# Patient Record
Sex: FEMALE | Race: WHITE
[De-identification: names, ages, dates, MRNs, and addresses within clinical notes are randomized per-mention and may not be internally consistent; named-entity substitution may affect disease eponyms.]

---

## 2017-08-11 ENCOUNTER — HOSPITAL ENCOUNTER (OUTPATIENT)
Dept: HOSPITAL 47 - RADUSWWP | Age: 22
Discharge: HOME | End: 2017-08-11
Payer: COMMERCIAL

## 2017-08-11 DIAGNOSIS — R10.11: Primary | ICD-10-CM

## 2017-08-11 LAB
ALP SERPL-CCNC: 76 U/L (ref 38–126)
ALT SERPL-CCNC: 33 U/L (ref 9–52)
ANION GAP SERPL CALC-SCNC: 11 MMOL/L
AST SERPL-CCNC: 21 U/L (ref 14–36)
BASOPHILS # BLD AUTO: 0 K/UL (ref 0–0.2)
BASOPHILS NFR BLD AUTO: 1 %
BUN SERPL-SCNC: 8 MG/DL (ref 7–17)
CALCIUM SPEC-MCNC: 9.1 MG/DL (ref 8.4–10.2)
CH: 27.5
CHCM: 34.8
CHLORIDE SERPL-SCNC: 105 MMOL/L (ref 98–107)
CHOLEST SERPL-MCNC: 171 MG/DL (ref ?–200)
CO2 SERPL-SCNC: 25 MMOL/L (ref 22–30)
EOSINOPHIL # BLD AUTO: 0.2 K/UL (ref 0–0.7)
EOSINOPHIL NFR BLD AUTO: 3 %
ERYTHROCYTE [DISTWIDTH] IN BLOOD BY AUTOMATED COUNT: 5.06 M/UL (ref 3.8–5.4)
ERYTHROCYTE [DISTWIDTH] IN BLOOD: 13.4 % (ref 11.5–15.5)
GLUCOSE SERPL-MCNC: 82 MG/DL (ref 74–99)
HCT VFR BLD AUTO: 40.1 % (ref 34–46)
HDLC SERPL-MCNC: 43 MG/DL (ref 40–60)
HDW: 3
HGB BLD-MCNC: 13.9 GM/DL (ref 11.4–16)
LUC NFR BLD AUTO: 2 %
LYMPHOCYTES # SPEC AUTO: 2 K/UL (ref 1–4.8)
LYMPHOCYTES NFR SPEC AUTO: 29 %
MCH RBC QN AUTO: 27.4 PG (ref 25–35)
MCHC RBC AUTO-ENTMCNC: 34.6 G/DL (ref 31–37)
MCV RBC AUTO: 79.3 FL (ref 80–100)
MONOCYTES # BLD AUTO: 0.4 K/UL (ref 0–1)
MONOCYTES NFR BLD AUTO: 5 %
NEUTROPHILS # BLD AUTO: 4.1 K/UL (ref 1.3–7.7)
NEUTROPHILS NFR BLD AUTO: 59 %
NON-AFRICAN AMERICAN GFR(MDRD): >60
POTASSIUM SERPL-SCNC: 4.4 MMOL/L (ref 3.5–5.1)
PROT SERPL-MCNC: 7.1 G/DL (ref 6.3–8.2)
SODIUM SERPL-SCNC: 141 MMOL/L (ref 137–145)
WBC # BLD AUTO: 0.14 10*3/UL
WBC # BLD AUTO: 6.9 K/UL (ref 3.8–10.6)
WBC (PEROX): 7.41

## 2017-08-11 PROCEDURE — 76705 ECHO EXAM OF ABDOMEN: CPT

## 2017-08-11 PROCEDURE — 85025 COMPLETE CBC W/AUTO DIFF WBC: CPT

## 2017-08-11 PROCEDURE — 80061 LIPID PANEL: CPT

## 2017-08-11 PROCEDURE — 84443 ASSAY THYROID STIM HORMONE: CPT

## 2017-08-11 PROCEDURE — 36415 COLL VENOUS BLD VENIPUNCTURE: CPT

## 2017-08-11 PROCEDURE — 80053 COMPREHEN METABOLIC PANEL: CPT

## 2017-08-11 NOTE — US
EXAMINATION TYPE: US gallbladder

 

DATE OF EXAM: 8/11/2017

 

COMPARISON: CT abdomen and pelvis November 18, 2013

 

CLINICAL HISTORY: R10.11 RUQ PAIN. Pain ongoing for 1 month

 

EXAM MEASUREMENTS:

 

Liver Length:  18.5 cm   

Gallbladder Wall:  0.2 cm   

CBD:  0.5 cm

Right Kidney:  10.0 x 5.8 x 4.5 cm

 

larger body habitus

 

Pancreas:  wnl

Liver:  upper limits of normal for size, difficult to penetrate  

Gallbladder:  0.5cm polyp

**Evidence for sonographic Larkin's sign:  no

CBD:  wnl 

Right Kidney:  wnl 

 

There is 5 mm nonshadowing nonmobile hyperechoic focus felt to reflect small polyp.

 

IMPRESSION: No shadowing mobile gallstones or ultrasound evidence for acute cholecystitis.

## 2018-07-09 ENCOUNTER — HOSPITAL ENCOUNTER (EMERGENCY)
Dept: HOSPITAL 47 - EC | Age: 23
Discharge: HOME | End: 2018-07-09
Payer: COMMERCIAL

## 2018-07-09 VITALS
RESPIRATION RATE: 18 BRPM | SYSTOLIC BLOOD PRESSURE: 121 MMHG | DIASTOLIC BLOOD PRESSURE: 64 MMHG | HEART RATE: 89 BPM | TEMPERATURE: 97.9 F

## 2018-07-09 DIAGNOSIS — L55.0: Primary | ICD-10-CM

## 2018-07-09 DIAGNOSIS — R19.7: ICD-10-CM

## 2018-07-09 DIAGNOSIS — F17.200: ICD-10-CM

## 2018-07-09 DIAGNOSIS — R11.0: ICD-10-CM

## 2018-07-09 PROCEDURE — 96375 TX/PRO/DX INJ NEW DRUG ADDON: CPT

## 2018-07-09 PROCEDURE — 96361 HYDRATE IV INFUSION ADD-ON: CPT

## 2018-07-09 PROCEDURE — 99283 EMERGENCY DEPT VISIT LOW MDM: CPT

## 2018-07-09 PROCEDURE — 96374 THER/PROPH/DIAG INJ IV PUSH: CPT

## 2018-07-09 NOTE — ED
Skin/Abscess/FB HPI





- General


Chief complaint: Skin/Abscess/Foreign Body


Stated complaint: Sunburn/Nausea


Time Seen by Provider: 07/09/18 06:30


Source: patient, RN notes reviewed, old records reviewed


Mode of arrival: ambulatory


Limitations: no limitations





- History of Present Illness


Initial comments: 





23-year-old female presents emergency room stay she would've severe sunburn 

over her entire body.  Patient reports that she was out at the beach yesterday 

for 5 hours, for the first time.  Patient states that she did wear sunblock.  

She reports today she had diarrhea this felt very nauseated.  She reports that 

she is not had any Motrin or Tylenol.  She try to make herself vomit.  She 

denies any fever but reports she feels chilled.  No other symptoms at this time.





- Related Data


 Home Medications











 Medication  Instructions  Recorded  Confirmed


 


Pnv,Calcium 72/Iron/Folic Acid 1 each PO DAILY 05/27/16 06/22/16





[Prenatal Plus Tablet]   








 Previous Rx's











 Medication  Instructions  Recorded


 


Acetaminophen-Codeine 300-30mg 1 each PO Q4HR PRN #30 tab 06/23/16





[Tylenol w/codeine #3]  


 


Ibuprofen [Motrin] 600 mg PO Q6HR PRN #60 tab 06/23/16


 


Ibuprofen [Motrin] 600 mg PO Q8HR PRN #20 tab 07/09/18











 Allergies











Allergy/AdvReac Type Severity Reaction Status Date / Time


 


No Known Allergies Allergy   Verified 07/09/18 06:23














Review of Systems


ROS Statement: 


Those systems with pertinent positive or pertinent negative responses have been 

documented in the HPI.





ROS Other: All systems not noted in ROS Statement are negative.





Past Medical History


Past Medical History: No Reported History


History of Any Multi-Drug Resistant Organisms: None Reported


Past Surgical History: No Surgical Hx Reported


Past Anesthesia/Blood Transfusion Reactions: No Reported Reaction


Past Psychological History: No Psychological Hx Reported


Smoking Status: Current some day smoker


Past Alcohol Use History: Occasional


Past Drug Use History: None Reported





- Past Family History


  ** Father


Family Medical History: No Reported History





General Exam





- General Exam Comments


Initial Comments: 





This Patient since she-year-old female.  Alert and oriented.  No acute distress.


Limitations: no limitations


General appearance: alert, in no apparent distress


Head exam: Present: atraumatic, normocephalic, normal inspection


Eye exam: Present: normal appearance, PERRL, EOMI.  Absent: scleral icterus, 

conjunctival injection, periorbital swelling


ENT exam: Present: normal exam, mucous membranes moist


Neck exam: Present: normal inspection.  Absent: tenderness, meningismus, 

lymphadenopathy


Respiratory exam: Present: normal lung sounds bilaterally.  Absent: respiratory 

distress, wheezes, rales, rhonchi, stridor


Cardiovascular Exam: Present: regular rate, normal rhythm, normal heart sounds.

  Absent: systolic murmur, diastolic murmur, rubs, gallop, clicks


Extremities exam: Present: normal inspection, full ROM, normal capillary 

refill.  Absent: tenderness, pedal edema, joint swelling, calf tenderness


Back exam: Present: normal inspection


Neurological exam: Present: alert, oriented X3, CN II-XII intact


Skin exam: Present: warm, dry, intact, normal color, rash (First-degree burn 

over chest abdomen back legs and arms and face evidence of lines from bathing 

suit.)





Course


 Vital Signs











  07/09/18





  06:19


 


Temperature 98.2 F


 


Pulse Rate 106 H


 


Respiratory 19





Rate 


 


Blood Pressure 110/65


 


O2 Sat by Pulse 100





Oximetry 














Medical Decision Making





- Medical Decision Making


23-year-old female presents emergency room stay she would've severe sunburn 

over her entire body.  Patient reports that she was out at the beach yesterday 

for 5 hours, for the first time.  Patient states that she did wear sunblock.  

She reports today she had diarrhea this felt very nauseated.  Patient has first-

degree burns over her chest, face, back, abdomen, arms and legs.  Patient was 

given 1 L of fluid, Toradol and Zofran.  She was reevaluated and feels much 

better at this time.  Discussion is to take anti-inflammatory medication and do 

no baths as well as apply aloe over her skin.  She is off today for work.  

Return tomorrow.  Discussed that should that she remain hydrated today.  A 

questions answered return parameters were discussed.








Disposition


Clinical Impression: 


 Sunburn of first degree





Disposition: HOME SELF-CARE


Condition: Good


Instructions:  Sunburn (ED)


Additional Instructions: 


Patient  is  to rest, remain hydrated.  Apply aloe to skin for pain relief.  

Patient should return to emergency department if any alarming signs or symptoms 

occur.  Take Motrin every 4-6 hours.


Prescriptions: 


Ibuprofen [Motrin] 600 mg PO Q8HR PRN #20 tab


 PRN Reason: Pain


Is patient prescribed a controlled substance at d/c from ED?: No


When asked, does pt state using other controlled substances?: No


If prescribed controlled substance>3 days was MAPS reviewed?: No


If opioid is for acute pain is fill amount 7 days or less?: No


If Rx opioid, was Start Talking consent form obtained?: No


Referrals: 


Duane Gill Jr, DO [Primary Care Provider] - 1-2 days


Time of Disposition: 07:46

## 2019-03-26 ENCOUNTER — HOSPITAL ENCOUNTER (EMERGENCY)
Dept: HOSPITAL 47 - EC | Age: 24
Discharge: HOME | End: 2019-03-26
Payer: MEDICAID

## 2019-03-26 VITALS — TEMPERATURE: 98.6 F | DIASTOLIC BLOOD PRESSURE: 80 MMHG | SYSTOLIC BLOOD PRESSURE: 123 MMHG | HEART RATE: 79 BPM

## 2019-03-26 VITALS — RESPIRATION RATE: 18 BRPM

## 2019-03-26 DIAGNOSIS — E16.2: Primary | ICD-10-CM

## 2019-03-26 DIAGNOSIS — Z87.891: ICD-10-CM

## 2019-03-26 DIAGNOSIS — J10.1: ICD-10-CM

## 2019-03-26 LAB
ALBUMIN SERPL-MCNC: 4.7 G/DL (ref 3.5–5)
ALP SERPL-CCNC: 74 U/L (ref 38–126)
ALT SERPL-CCNC: 42 U/L (ref 9–52)
ANION GAP SERPL CALC-SCNC: 11 MMOL/L
AST SERPL-CCNC: 33 U/L (ref 14–36)
BASOPHILS # BLD AUTO: 0 K/UL (ref 0–0.2)
BASOPHILS NFR BLD AUTO: 1 %
BUN SERPL-SCNC: 10 MG/DL (ref 7–17)
CALCIUM SPEC-MCNC: 9.2 MG/DL (ref 8.4–10.2)
CHLORIDE SERPL-SCNC: 102 MMOL/L (ref 98–107)
CO2 SERPL-SCNC: 25 MMOL/L (ref 22–30)
EOSINOPHIL # BLD AUTO: 0 K/UL (ref 0–0.7)
EOSINOPHIL NFR BLD AUTO: 1 %
ERYTHROCYTE [DISTWIDTH] IN BLOOD BY AUTOMATED COUNT: 4.91 M/UL (ref 3.8–5.4)
ERYTHROCYTE [DISTWIDTH] IN BLOOD: 12.5 % (ref 11.5–15.5)
GLUCOSE BLD-MCNC: 86 MG/DL (ref 75–99)
GLUCOSE SERPL-MCNC: 78 MG/DL (ref 74–99)
HCT VFR BLD AUTO: 39.8 % (ref 34–46)
HGB BLD-MCNC: 13 GM/DL (ref 11.4–16)
LYMPHOCYTES # SPEC AUTO: 1.4 K/UL (ref 1–4.8)
LYMPHOCYTES NFR SPEC AUTO: 33 %
MCH RBC QN AUTO: 26.5 PG (ref 25–35)
MCHC RBC AUTO-ENTMCNC: 32.7 G/DL (ref 31–37)
MCV RBC AUTO: 81 FL (ref 80–100)
MONOCYTES # BLD AUTO: 0.4 K/UL (ref 0–1)
MONOCYTES NFR BLD AUTO: 8 %
NEUTROPHILS # BLD AUTO: 2.4 K/UL (ref 1.3–7.7)
NEUTROPHILS NFR BLD AUTO: 56 %
PH UR: 6 [PH] (ref 5–8)
PLATELET # BLD AUTO: 199 K/UL (ref 150–450)
POTASSIUM SERPL-SCNC: 4.7 MMOL/L (ref 3.5–5.1)
PROT SERPL-MCNC: 7.9 G/DL (ref 6.3–8.2)
SODIUM SERPL-SCNC: 138 MMOL/L (ref 137–145)
SP GR UR: 1.02 (ref 1–1.03)
UROBILINOGEN UR QL STRIP: <2 MG/DL (ref ?–2)
WBC # BLD AUTO: 4.4 K/UL (ref 3.8–10.6)

## 2019-03-26 PROCEDURE — 93005 ELECTROCARDIOGRAM TRACING: CPT

## 2019-03-26 PROCEDURE — 81025 URINE PREGNANCY TEST: CPT

## 2019-03-26 PROCEDURE — 85025 COMPLETE CBC W/AUTO DIFF WBC: CPT

## 2019-03-26 PROCEDURE — 87502 INFLUENZA DNA AMP PROBE: CPT

## 2019-03-26 PROCEDURE — 80053 COMPREHEN METABOLIC PANEL: CPT

## 2019-03-26 PROCEDURE — 99284 EMERGENCY DEPT VISIT MOD MDM: CPT

## 2019-03-26 PROCEDURE — 96360 HYDRATION IV INFUSION INIT: CPT

## 2019-03-26 PROCEDURE — 81003 URINALYSIS AUTO W/O SCOPE: CPT

## 2019-03-26 PROCEDURE — 96361 HYDRATE IV INFUSION ADD-ON: CPT

## 2019-03-26 PROCEDURE — 36415 COLL VENOUS BLD VENIPUNCTURE: CPT

## 2019-03-26 NOTE — ED
General Adult HPI





- General


Chief complaint: Dizziness


Stated complaint: Hypoglycemia


Time Seen by Provider: 03/26/19 11:39


Source: patient, RN notes reviewed


Mode of arrival: ambulatory


Limitations: no limitations





- History of Present Illness


Initial comments: 





Patient 23-year-old female presented to the emergency room today with chief 

complaint of possible hypoglycemia.  She does admit that she feels like her 

sugars dropping.  She does admit that she's been sick with cough congestion and 

body aches and chills.  She states she has the flu but has not been tested.  She

states the symptoms started 4 days ago.  She states that she's been trying to ea

t and drink here and she states that she's had some dizzy episodes.  She does 

admit that it's better after she eats something.  She states that yesterday she 

felt very dizzy and her father helped her eat some peanut butter which made her 

feel better.  Patient states she feels well at this time is not having the 

dizziness currently.  She denies any other complaints.  States not taken any 

Tylenol or Motrin for any fever. Patient denies any recent shortness of breath, 

chest pain, back pain, abdominal pain, nausea or vomiting, numbness or tingling,

headaches or visual changes, or any other complaints.





- Related Data


                                Home Medications











 Medication  Instructions  Recorded  Confirmed


 


No Known Home Medications  03/26/19 03/26/19











                                    Allergies











Allergy/AdvReac Type Severity Reaction Status Date / Time


 


No Known Allergies Allergy   Verified 03/26/19 11:46














Review of Systems


ROS Statement: 


Those systems with pertinent positive or pertinent negative responses have been 

documented in the HPI.





ROS Other: All systems not noted in ROS Statement are negative.





Past Medical History


Past Medical History: No Reported History


History of Any Multi-Drug Resistant Organisms: None Reported


Past Surgical History: No Surgical Hx Reported


Past Anesthesia/Blood Transfusion Reactions: No Reported Reaction


Past Psychological History: No Psychological Hx Reported


Smoking Status: Former smoker


Past Alcohol Use History: Occasional


Past Drug Use History: None Reported





- Past Family History


  ** Father


Family Medical History: No Reported History





General Exam





- General Exam Comments


Initial Comments: 





General:  The patient is awake and alert, in no distress, and does not appear 

acutely ill. 


Eye:  Pupils are equal, round and reactive to light, extra-ocular movements are 

intact.  No nystagmus.  There is normal conjunctiva bilaterally.  No signs of 

icterus.  


Ears, nose, mouth and throat:  There are moist mucous membranes and no oral 

lesions. 


Neck:  The neck is supple, there is no tenderness or JVD.  


Cardiovascular:  There is a regular rate and rhythm. No murmur, rub or gallop is

appreciated.


Respiratory:  Lungs are clear to auscultation, respirations are non-labored, 

breath sounds are equal.  No wheezes, stridor, rales, or rhonchi.


Musculoskeletal:  Normal ROM, no tenderness.  Strength 5/5. Sensation intact. 

Pulses equal bilaterally 2+.  


Neurological:  A&O x 3. CN II-XII intact, There are no obvious motor or sensory 

deficits. Coordination appears grossly intact. Speech is normal.


Skin:  Skin is warm and dry and no rashes or lesions are noted. 


Psychiatric:  Cooperative, appropriate mood & affect, normal judgment.  


Limitations: no limitations





Course


                                   Vital Signs











  03/26/19





  11:33


 


Temperature 99.2 F


 


Pulse Rate 88


 


Respiratory 18





Rate 


 


Blood Pressure 127/83


 


O2 Sat by Pulse 98





Oximetry 














Medical Decision Making





- Medical Decision Making





Patient reexamined at this time shows no signs of distress.  Patient is 

influenza A positive.  She has been sick over the last 4 days.  She is out side 

of therapeutic range for Tamiflu.  Patient blood work reviewed in a finger stick

glucose was 86 on the blood work 78.  Patient does admit that she's felt like 

her sugars drop which are audible recorded at home.  Her appetite has been 

decreased due to recent influenza.  Patient does admit that she's feeling 

better.  She states appetite has been increasing.  She is advised that she 

should be eating regular meals.  Advised that she can keep a snack with her as 

well to help keep her sugar elevated.  At this time she'll be discharged home 

she is advised follow-up family doctor over the next 2 days.  Advised return if 

any symptoms increase or worsen.





- Lab Data


Result diagrams: 


                                 03/26/19 12:57





                                 03/26/19 12:57


                                   Lab Results











  03/26/19 03/26/19 03/26/19 Range/Units





  12:34 12:34 12:38 


 


WBC     (3.8-10.6)  k/uL


 


RBC     (3.80-5.40)  m/uL


 


Hgb     (11.4-16.0)  gm/dL


 


Hct     (34.0-46.0)  %


 


MCV     (80.0-100.0)  fL


 


MCH     (25.0-35.0)  pg


 


MCHC     (31.0-37.0)  g/dL


 


RDW     (11.5-15.5)  %


 


Plt Count     (150-450)  k/uL


 


Neutrophils %     %


 


Lymphocytes %     %


 


Monocytes %     %


 


Eosinophils %     %


 


Basophils %     %


 


Neutrophils #     (1.3-7.7)  k/uL


 


Lymphocytes #     (1.0-4.8)  k/uL


 


Monocytes #     (0-1.0)  k/uL


 


Eosinophils #     (0-0.7)  k/uL


 


Basophils #     (0-0.2)  k/uL


 


Sodium     (137-145)  mmol/L


 


Potassium     (3.5-5.1)  mmol/L


 


Chloride     ()  mmol/L


 


Carbon Dioxide     (22-30)  mmol/L


 


Anion Gap     mmol/L


 


BUN     (7-17)  mg/dL


 


Creatinine     (0.52-1.04)  mg/dL


 


Est GFR (CKD-EPI)AfAm     (>60 ml/min/1.73 sqM)  


 


Est GFR (CKD-EPI)NonAf     (>60 ml/min/1.73 sqM)  


 


Glucose     (74-99)  mg/dL


 


POC Glucose (mg/dL)    86  (75-99)  mg/dL


 


POC Glu Operater ID    Salgat, Rosemarie  


 


Calcium     (8.4-10.2)  mg/dL


 


Total Bilirubin     (0.2-1.3)  mg/dL


 


AST     (14-36)  U/L


 


ALT     (9-52)  U/L


 


Alkaline Phosphatase     ()  U/L


 


Total Protein     (6.3-8.2)  g/dL


 


Albumin     (3.5-5.0)  g/dL


 


Urine Color   Yellow   


 


Urine Appearance   Clear   (Clear)  


 


Urine pH   6.0   (5.0-8.0)  


 


Ur Specific Gravity   1.021   (1.001-1.035)  


 


Urine Protein   Trace H   (Negative)  


 


Urine Glucose (UA)   Negative   (Negative)  


 


Urine Ketones   Negative   (Negative)  


 


Urine Blood   Negative   (Negative)  


 


Urine Nitrite   Negative   (Negative)  


 


Urine Bilirubin   Negative   (Negative)  


 


Urine Urobilinogen   <2.0   (<2.0)  mg/dL


 


Ur Leukocyte Esterase   Negative   (Negative)  


 


Urine HCG, Qual  Not Detected    (Not Detectd)  


 


Influenza Type A RNA     (Not Detectd)  


 


Influenza Type B (PCR)     (Not Detectd)  














  03/26/19 03/26/19 03/26/19 Range/Units





  12:40 12:57 12:57 


 


WBC   4.4   (3.8-10.6)  k/uL


 


RBC   4.91   (3.80-5.40)  m/uL


 


Hgb   13.0   (11.4-16.0)  gm/dL


 


Hct   39.8   (34.0-46.0)  %


 


MCV   81.0   (80.0-100.0)  fL


 


MCH   26.5   (25.0-35.0)  pg


 


MCHC   32.7   (31.0-37.0)  g/dL


 


RDW   12.5   (11.5-15.5)  %


 


Plt Count   199   (150-450)  k/uL


 


Neutrophils %   56   %


 


Lymphocytes %   33   %


 


Monocytes %   8   %


 


Eosinophils %   1   %


 


Basophils %   1   %


 


Neutrophils #   2.4   (1.3-7.7)  k/uL


 


Lymphocytes #   1.4   (1.0-4.8)  k/uL


 


Monocytes #   0.4   (0-1.0)  k/uL


 


Eosinophils #   0.0   (0-0.7)  k/uL


 


Basophils #   0.0   (0-0.2)  k/uL


 


Sodium    138  (137-145)  mmol/L


 


Potassium    4.7  (3.5-5.1)  mmol/L


 


Chloride    102  ()  mmol/L


 


Carbon Dioxide    25  (22-30)  mmol/L


 


Anion Gap    11  mmol/L


 


BUN    10  (7-17)  mg/dL


 


Creatinine    0.67  (0.52-1.04)  mg/dL


 


Est GFR (CKD-EPI)AfAm    >90  (>60 ml/min/1.73 sqM)  


 


Est GFR (CKD-EPI)NonAf    >90  (>60 ml/min/1.73 sqM)  


 


Glucose    78  (74-99)  mg/dL


 


POC Glucose (mg/dL)     (75-99)  mg/dL


 


POC Glu Operater ID     


 


Calcium    9.2  (8.4-10.2)  mg/dL


 


Total Bilirubin    0.4  (0.2-1.3)  mg/dL


 


AST    33  (14-36)  U/L


 


ALT    42  (9-52)  U/L


 


Alkaline Phosphatase    74  ()  U/L


 


Total Protein    7.9  (6.3-8.2)  g/dL


 


Albumin    4.7  (3.5-5.0)  g/dL


 


Urine Color     


 


Urine Appearance     (Clear)  


 


Urine pH     (5.0-8.0)  


 


Ur Specific Gravity     (1.001-1.035)  


 


Urine Protein     (Negative)  


 


Urine Glucose (UA)     (Negative)  


 


Urine Ketones     (Negative)  


 


Urine Blood     (Negative)  


 


Urine Nitrite     (Negative)  


 


Urine Bilirubin     (Negative)  


 


Urine Urobilinogen     (<2.0)  mg/dL


 


Ur Leukocyte Esterase     (Negative)  


 


Urine HCG, Qual     (Not Detectd)  


 


Influenza Type A RNA  Detected H    (Not Detectd)  


 


Influenza Type B (PCR)  Not Detected    (Not Detectd)  














Disposition


Clinical Impression: 


 Influenza A, Hypoglycemia





Disposition: HOME SELF-CARE


Condition: Good


Instructions (If sedation given, give patient instructions):  Influenza (ED)


Additional Instructions: 


Please follow-up with family doctor in the next 2 days of symptoms have not 

improved.  Please return to emergency room if the symptoms increase or worsen or

for any other concerns.


Is patient prescribed a controlled substance at d/c from ED?: No


Referrals: 


Duane Gill Jr,  [Primary Care Provider] - 1-2 days


Time of Disposition: 13:44

## 2019-09-06 ENCOUNTER — HOSPITAL ENCOUNTER (EMERGENCY)
Dept: HOSPITAL 47 - EC | Age: 24
Discharge: HOME | End: 2019-09-06
Payer: MEDICAID

## 2019-09-06 VITALS — RESPIRATION RATE: 18 BRPM | TEMPERATURE: 98.7 F

## 2019-09-06 VITALS — DIASTOLIC BLOOD PRESSURE: 68 MMHG | SYSTOLIC BLOOD PRESSURE: 113 MMHG | HEART RATE: 80 BPM

## 2019-09-06 DIAGNOSIS — M94.0: Primary | ICD-10-CM

## 2019-09-06 DIAGNOSIS — F17.200: ICD-10-CM

## 2019-09-06 LAB
ALBUMIN SERPL-MCNC: 4.4 G/DL (ref 3.5–5)
ALP SERPL-CCNC: 61 U/L (ref 38–126)
ALT SERPL-CCNC: 39 U/L (ref 9–52)
ANION GAP SERPL CALC-SCNC: 10 MMOL/L
AST SERPL-CCNC: 31 U/L (ref 14–36)
BASOPHILS # BLD AUTO: 0.1 K/UL (ref 0–0.2)
BASOPHILS NFR BLD AUTO: 1 %
BUN SERPL-SCNC: 9 MG/DL (ref 7–17)
CALCIUM SPEC-MCNC: 9.1 MG/DL (ref 8.4–10.2)
CHLORIDE SERPL-SCNC: 103 MMOL/L (ref 98–107)
CO2 SERPL-SCNC: 27 MMOL/L (ref 22–30)
EOSINOPHIL # BLD AUTO: 0.5 K/UL (ref 0–0.7)
EOSINOPHIL NFR BLD AUTO: 6 %
ERYTHROCYTE [DISTWIDTH] IN BLOOD BY AUTOMATED COUNT: 4.94 M/UL (ref 3.8–5.4)
ERYTHROCYTE [DISTWIDTH] IN BLOOD: 14.7 % (ref 11.5–15.5)
GLUCOSE SERPL-MCNC: 98 MG/DL (ref 74–99)
HCT VFR BLD AUTO: 40.6 % (ref 34–46)
HGB BLD-MCNC: 14.2 GM/DL (ref 11.4–16)
LYMPHOCYTES # SPEC AUTO: 2.5 K/UL (ref 1–4.8)
LYMPHOCYTES NFR SPEC AUTO: 29 %
MCH RBC QN AUTO: 28.8 PG (ref 25–35)
MCHC RBC AUTO-ENTMCNC: 35 G/DL (ref 31–37)
MCV RBC AUTO: 82.1 FL (ref 80–100)
MONOCYTES # BLD AUTO: 0.5 K/UL (ref 0–1)
MONOCYTES NFR BLD AUTO: 5 %
NEUTROPHILS # BLD AUTO: 5 K/UL (ref 1.3–7.7)
NEUTROPHILS NFR BLD AUTO: 56 %
PLATELET # BLD AUTO: 309 K/UL (ref 150–450)
POTASSIUM SERPL-SCNC: 4.2 MMOL/L (ref 3.5–5.1)
PROT SERPL-MCNC: 7.5 G/DL (ref 6.3–8.2)
SODIUM SERPL-SCNC: 140 MMOL/L (ref 137–145)
WBC # BLD AUTO: 8.8 K/UL (ref 3.8–10.6)

## 2019-09-06 PROCEDURE — 93005 ELECTROCARDIOGRAM TRACING: CPT

## 2019-09-06 PROCEDURE — 99284 EMERGENCY DEPT VISIT MOD MDM: CPT

## 2019-09-06 PROCEDURE — 36415 COLL VENOUS BLD VENIPUNCTURE: CPT

## 2019-09-06 PROCEDURE — 80053 COMPREHEN METABOLIC PANEL: CPT

## 2019-09-06 PROCEDURE — 96374 THER/PROPH/DIAG INJ IV PUSH: CPT

## 2019-09-06 PROCEDURE — 85379 FIBRIN DEGRADATION QUANT: CPT

## 2019-09-06 PROCEDURE — 96361 HYDRATE IV INFUSION ADD-ON: CPT

## 2019-09-06 PROCEDURE — 85025 COMPLETE CBC W/AUTO DIFF WBC: CPT

## 2019-09-06 PROCEDURE — 71046 X-RAY EXAM CHEST 2 VIEWS: CPT

## 2019-09-06 PROCEDURE — 83690 ASSAY OF LIPASE: CPT

## 2019-09-06 PROCEDURE — 84484 ASSAY OF TROPONIN QUANT: CPT

## 2019-09-06 RX ADMIN — KETOROLAC TROMETHAMINE STA MG: 30 INJECTION, SOLUTION INTRAMUSCULAR at 08:47

## 2019-09-06 RX ADMIN — NICARDIPINE HYDROCHLORIDE STA MLS/HR: 2.5 INJECTION INTRAVENOUS at 08:47

## 2019-09-06 NOTE — XR
EXAMINATION TYPE: XR chest 2V

 

DATE OF EXAM: 9/6/2019

 

COMPARISON: 5/6/2010

 

HISTORY: 24-year-old female with chest pain and shortness of breath

 

TECHNIQUE:  PA and lateral views

 

FINDINGS:  

The cardiomediastinal silhouette, aorta, and pulmonary vasculature are within normal limits. There is
 some focal patchy right basilar opacity. No pleural effusion.

 

 

IMPRESSION:  

Patchy right basilar atelectasis versus early pneumonia. Correlate with patient's symptoms.

## 2019-09-06 NOTE — ED
Chest Pain HPI





- General


Chief Complaint: Chest Pain


Stated Complaint: CHEST PAIN, BACK OF NECK AND LEFT ARM NUMBNESS


Time Seen by Provider: 09/06/19 08:31


Source: patient, RN notes reviewed


Mode of arrival: ambulatory


Limitations: no limitations





- History of Present Illness


Initial Comments: 





24-year-old female presents emergency Department chief complaint chest pain.  

Patient states that symptoms started around 2 AM.  Patient denies any prior 

cardiac disease no history of lung disease.  Patient states that she has pain 

with movement and deep inspiration which complaint of shortness of breath and 

pain that radiates into her arm region but states is worse with arm movement.  

Patient denies any focal weakness no current vomiting states that she was 

nauseated for the pain but is improving.  Patient did not take any pain meds.  

Patient has no family heart disease.





- Related Data


                                  Previous Rx's











 Medication  Instructions  Recorded


 


Ibuprofen [Motrin] 600 mg PO Q8HR PRN #30 tab 09/06/19











                                    Allergies











Allergy/AdvReac Type Severity Reaction Status Date / Time


 


No Known Allergies Allergy   Verified 09/06/19 09:01














Review of Systems


ROS Statement: 


Those systems with pertinent positive or pertinent negative responses have been 

documented in the HPI.





ROS Other: All systems not noted in ROS Statement are negative.





EKG Findings





- EKG Comments:


EKG Findings:: EKG performed at a: 47 normal sinus rhythm rate of 93  QRS 

90 QT//457





Past Medical History


Past Medical History: No Reported History


History of Any Multi-Drug Resistant Organisms: None Reported


Past Surgical History: Ear Surgery


Past Anesthesia/Blood Transfusion Reactions: No Reported Reaction


Past Psychological History: Depression


Smoking Status: Current every day smoker


Past Alcohol Use History: Occasional


Past Drug Use History: None Reported





- Past Family History


  ** Father


Family Medical History: No Reported History





General Exam


Limitations: no limitations


General appearance: alert, in no apparent distress


Head exam: Present: atraumatic, normocephalic, normal inspection


Eye exam: Present: normal appearance, PERRL, EOMI.  Absent: scleral icterus, 

conjunctival injection, periorbital swelling


ENT exam: Present: normal exam, normal oropharynx, mucous membranes moist


Neck exam: Present: normal inspection, full ROM.  Absent: tenderness, 

meningismus, lymphadenopathy


Respiratory exam: Present: normal lung sounds bilaterally, chest wall tenderness

 (Tenderness sternal to left sided).  Absent: respiratory distress, wheezes, 

rales, rhonchi, stridor


Cardiovascular Exam: Present: regular rate, normal rhythm, normal heart sounds. 

 Absent: systolic murmur, diastolic murmur, rubs, gallop, clicks


GI/Abdominal exam: Present: soft, normal bowel sounds.  Absent: distended, 

tenderness, guarding, rebound, rigid


Extremities exam: Present: normal inspection, full ROM, normal capillary refill.

  Absent: tenderness, pedal edema, joint swelling, calf tenderness


Back exam: Present: full ROM.  Absent: tenderness


Neurological exam: Present: alert, oriented X3, CN II-XII intact, reflexes 

normal.  Absent: motor sensory deficit


Skin exam: Present: warm, dry, intact, normal color.  Absent: rash





Course





                                   Vital Signs











  09/06/19





  08:21


 


Temperature 98.7 F


 


Pulse Rate 97


 


Respiratory 18





Rate 


 


Blood Pressure 140/84


 


O2 Sat by Pulse 100





Oximetry 














Chest Pain MDM





- MDM





24-year-old female presents emergency Department with chief complaint of chest 

pain.  Patient had labs, EKG, chest x-ray.  Patient symptoms are reproducible 

more consistent with chest wall pain costochondritis.  Patient we discharged 

with anti-inflammatories.





Disposition


Clinical Impression: 


 Chest wall pain, Costochondritis, acute





Disposition: HOME SELF-CARE


Condition: Stable


Instructions (If sedation given, give patient instructions):  Costochondritis 

(ED), Chest Pain (ED)


Additional Instructions: 


Please return to the Emergency Department if symptoms worsen or any other 

concerns.


Prescriptions: 


Ibuprofen [Motrin] 600 mg PO Q8HR PRN #30 tab


 PRN Reason: Pain


Is patient prescribed a controlled substance at d/c from ED?: No


Referrals: 


Duane Gill Jr,  [Primary Care Provider] - 1-2 days


Time of Disposition: 09:46

## 2021-10-06 ENCOUNTER — HOSPITAL ENCOUNTER (OUTPATIENT)
Dept: HOSPITAL 47 - RADUSWWP | Age: 26
Discharge: HOME | End: 2021-10-06
Attending: SURGERY
Payer: COMMERCIAL

## 2021-10-06 DIAGNOSIS — K82.4: ICD-10-CM

## 2021-10-06 DIAGNOSIS — D41.4: Primary | ICD-10-CM

## 2021-10-06 PROCEDURE — 76705 ECHO EXAM OF ABDOMEN: CPT

## 2021-10-06 NOTE — US
EXAMINATION TYPE: US gallbladder

 

DATE OF EXAM: 10/6/2021

 

COMPARISON: US 2015

 

CLINICAL HISTORY: K82.4 GB POLYP. Patient states no symptoms, has a history of gallbladder polyp

 

EXAM MEASUREMENTS:

 

Liver Length:  16.2 cm   

Gallbladder Wall:  0.2 cm   

CBD:  0.3 cm

Right Kidney:  10.6 x 3.9 x 5.8 cm

 

 

 

Pancreas:  wnl

Liver:  wnl  

Gallbladder:  small 0.4cm polyp 

**Evidence for sonographic Larkin's sign:  no

CBD:  wnl 

Right Kidney:  wnl 

 

 

 

IMPRESSION: 

1. Tiny polyp or small nonshadowing gallstone within the gallbladder.

## 2021-11-09 ENCOUNTER — HOSPITAL ENCOUNTER (OUTPATIENT)
Dept: HOSPITAL 47 - ORWHC2ENDO | Age: 26
End: 2021-11-09
Attending: SURGERY
Payer: COMMERCIAL

## 2021-11-09 VITALS — RESPIRATION RATE: 16 BRPM

## 2021-11-09 VITALS — TEMPERATURE: 98.6 F

## 2021-11-09 VITALS — HEART RATE: 69 BPM | SYSTOLIC BLOOD PRESSURE: 117 MMHG | DIASTOLIC BLOOD PRESSURE: 77 MMHG

## 2021-11-09 VITALS — BODY MASS INDEX: 24.7 KG/M2

## 2021-11-09 DIAGNOSIS — F17.210: ICD-10-CM

## 2021-11-09 DIAGNOSIS — K60.2: Primary | ICD-10-CM

## 2021-11-09 PROCEDURE — 81025 URINE PREGNANCY TEST: CPT

## 2021-11-09 PROCEDURE — 45330 DIAGNOSTIC SIGMOIDOSCOPY: CPT

## 2021-11-09 PROCEDURE — 46600 DIAGNOSTIC ANOSCOPY SPX: CPT

## 2021-11-09 NOTE — P.PCN
Date of Procedure: 11/09/21


Procedure(s) Performed: 


PREOPERATIVE DIAGNOSIS: Rectal bleeding


POSTOPERATIVE DIAGNOSIS: Anal fissure


PROCEDURE: Flexible sigmoidoscopy, anoscopy


ANESTHESIA: MAC


SURGEON: Chad Laughlin M.D.


SPECIMENS: None


ENDOSCOPIC PROCEDURE:  The patient was placed on the endoscopy table in the left

decubitus position.  The Olympus colonoscope was inserted into the anus and 

passed under direct visualization to the proximal sigmoid colon.  The scope was 

withdrawn.  There were no inflammatory or neoplastic changes throughout the 

sigmoid or rectum.  Retroflexion at the anus revealed no significant 

abnormalities.  The anoscope was then utilized.  No sizable hemorrhoids were 

noted.  The patient did have induration at the posterior aspect of the anus with

some induration there.  This was likely the pimple-like area the patient was 

describing.  Just deep to that within the anal canal was a definite anal fissure

that appeared to be the most likely source of recent pain and bleeding.  This 

had a subacute/chronic appearance to it.  The patient was taken to the recovery 

room in stable condition per anesthesia guidelines.


RECOMMENDATIONS: Resume diet.  We'll discuss endoscopy findings with the 

patient.  Begin nitroglycerin ointment.

## 2021-11-09 NOTE — P.GSHP
History of Present Illness


H&P Date: 11/09/21


Chief Complaint: Rectal bleeding





Patient here today for flexible sigmoidoscopy with hemorrhoidal banding.  

Patient has complaints of a bulge in the right perianal location.  Says it is 

hard for her to tell if it is internal or external went feels like a pimple at 

times.  Mild discomfort as well.





Past Medical History


Past Medical History: No Reported History


History of Any Multi-Drug Resistant Organisms: None Reported


Past Surgical History: Ear Surgery


Past Anesthesia/Blood Transfusion Reactions: No Reported Reaction, Motion 

Sickness


Past Psychological History: Anxiety, Depression


Smoking Status: Current every day smoker


Past Alcohol Use History: Occasional


Additional Past Alcohol Use History / Comment(s): Smokes 1-2 cigarettes daily 

X3-4 yrs.


Past Drug Use History: None Reported


Additional Drug Use History / Comment(s): Marijuana once a month. Aware no use 

24 hrs prior to procedure.





- Past Family History


  ** Father


Family Medical History: Unable to Obtain


Additional Family Medical History / Comment(s): Patient was adopted, family hx 

unknown.





Medications and Allergies


                                Home Medications











 Medication  Instructions  Recorded  Confirmed  Type


 


No Known Home Medications  11/08/21 11/08/21 History








                                    Allergies











Allergy/AdvReac Type Severity Reaction Status Date / Time


 


No Known Allergies Allergy   Verified 11/08/21 08:33














Surgical - Exam


                                   Vital Signs











Temp Pulse Resp BP Pulse Ox


 


 98.6 F   97   20   139/74   99 


 


 11/09/21 10:29  11/09/21 10:29  11/09/21 10:29  11/09/21 10:29  11/09/21 10:29

















Physical exam:


General: Well-developed, well-nourished


HEENT: Normocephalic, sclerae nonicteric


Abdomen: Nontender, nondistended


Extremities: No edema


Neuro: Alert and oriented





Assessment and Plan


(1) Rectal bleeding


Narrative/Plan: 


Will proceed with flexible sigmoidoscopy with possible hemorrhoidal banding.  

Risks of bleeding, infection, scarring, pain, recurrence reviewed.  She 

understands and wishes to proceed.


Current Visit: Yes   Status: Acute   Code(s): K62.5 - HEMORRHAGE OF ANUS AND 

RECTUM   SNOMED Code(s): 02424617

## 2022-01-02 ENCOUNTER — HOSPITAL ENCOUNTER (EMERGENCY)
Dept: HOSPITAL 47 - EC | Age: 27
LOS: 1 days | Discharge: HOME | End: 2022-01-03
Payer: COMMERCIAL

## 2022-01-02 DIAGNOSIS — Z20.822: ICD-10-CM

## 2022-01-02 DIAGNOSIS — O46.91: Primary | ICD-10-CM

## 2022-01-02 DIAGNOSIS — F17.210: ICD-10-CM

## 2022-01-02 DIAGNOSIS — Z3A.09: ICD-10-CM

## 2022-01-02 DIAGNOSIS — O21.9: ICD-10-CM

## 2022-01-02 DIAGNOSIS — E86.0: ICD-10-CM

## 2022-01-02 DIAGNOSIS — O99.281: ICD-10-CM

## 2022-01-02 DIAGNOSIS — O99.331: ICD-10-CM

## 2022-01-02 PROCEDURE — 96375 TX/PRO/DX INJ NEW DRUG ADDON: CPT

## 2022-01-02 PROCEDURE — 84702 CHORIONIC GONADOTROPIN TEST: CPT

## 2022-01-02 PROCEDURE — 96361 HYDRATE IV INFUSION ADD-ON: CPT

## 2022-01-02 PROCEDURE — 96374 THER/PROPH/DIAG INJ IV PUSH: CPT

## 2022-01-02 PROCEDURE — 86900 BLOOD TYPING SEROLOGIC ABO: CPT

## 2022-01-02 PROCEDURE — 81001 URINALYSIS AUTO W/SCOPE: CPT

## 2022-01-02 PROCEDURE — 76801 OB US < 14 WKS SINGLE FETUS: CPT

## 2022-01-02 PROCEDURE — 85025 COMPLETE CBC W/AUTO DIFF WBC: CPT

## 2022-01-02 PROCEDURE — 96372 THER/PROPH/DIAG INJ SC/IM: CPT

## 2022-01-02 PROCEDURE — 87635 SARS-COV-2 COVID-19 AMP PRB: CPT

## 2022-01-02 PROCEDURE — 99284 EMERGENCY DEPT VISIT MOD MDM: CPT

## 2022-01-02 PROCEDURE — 86850 RBC ANTIBODY SCREEN: CPT

## 2022-01-02 PROCEDURE — 86901 BLOOD TYPING SEROLOGIC RH(D): CPT

## 2022-01-02 PROCEDURE — 36415 COLL VENOUS BLD VENIPUNCTURE: CPT

## 2022-01-02 PROCEDURE — 80053 COMPREHEN METABOLIC PANEL: CPT

## 2022-01-02 NOTE — US
EXAMINATION TYPE: Transabdominal

 

DATE OF EXAM: 1/2/2022 10:21 PM

 

COMPARISON: NONE

 

CLINICAL HISTORY: pain. Pelvic pain with nausea and vomiting 

 

EXAM PERFORMED:  Transabdominal (TA)

 

EXAM MEASUREMENTS:

 

GESTATIONAL AGE / DATING

 

Dates by LMP: (8 weeks/0 days)  

** EDC: 08/14/2022

Dates by First Scan: (9 weeks/1 days)  

** EDC: 08/05/2022

Dates by Current Scan for: (9 weeks/2 days)  

** EDC: 08/05/2022

 

MATERNAL ANATOMY 

 

Uterus:

Right Ovary: 3.0 x 2.3 x 2.5 cm

Left Ovary: 3.7 x 1.9 x 1.62 cm

Post CDS / Adnexa: WNL

Presence of free fluid: None

Presence of corpus luteal cyst: Yes on Right measuring 1.9 x 1.7 x 1.8 cm

Presence of subchorionic bleed: Hypoechoic next to GS 1.6 x 0.8 x 1.2 cm 

 

GESTATION / FETAL SURVEY

 

CRL: (9 weeks/2 days)

Yolk Sac (normal less than 6mm): 0.3 cm

Heart Rate: 174 bpm

Rhythm:  Normal

IUP:  Viable IUP

 

Date of LMP: 11/07/2021 

 

Single live IUP, Hypoechoic area seen next to Gestational Sac, possible subchorionic bleed.  

 

 

 

IMPRESSION:

The ultrasound gestational age is 9 weeks and 2 days. Possible tiny subchorionic hemorrhage.

## 2022-01-02 NOTE — ED
General Adult HPI





- General


Stated complaint: 9wks preg, bleeding and cramping


Source: patient, RN notes reviewed


Mode of arrival: ambulatory


Limitations: no limitations





- History of Present Illness


Initial comments: 


This a 26-year-old female presents emergency Department chief complaint of 

nausea and vomiting, vaginal bleeding early pregnancy.  Patient is unsure for 

along she is.  She is states she is seen Dr. Mckeon the past.  She states she's 

had bleeding for 1 week but states that the bleeding was heavier today.  Patient

states she cannot keep down she's had severe nausea and vomiting.  Patient 

states that she's had a lower abdominal cramping diffusely.  Patient states that

she is  A0.








- Related Data


                                  Previous Rx's











 Medication  Instructions  Recorded


 


Ondansetron Odt [Zofran Odt] 4 mg PO Q8HR PRN #10 tab 22











                                    Allergies











Allergy/AdvReac Type Severity Reaction Status Date / Time


 


No Known Allergies Allergy   Verified 22 21:49














Review of Systems


ROS Statement: 


Those systems with pertinent positive or pertinent negative responses have been 

documented in the HPI.





ROS Other: All systems not noted in ROS Statement are negative.





Past Medical History


Past Medical History: No Reported History


History of Any Multi-Drug Resistant Organisms: None Reported


Past Surgical History: Ear Surgery


Past Anesthesia/Blood Transfusion Reactions: No Reported Reaction, Motion 

Sickness


Past Psychological History: Anxiety, Depression


Smoking Status: Current every day smoker


Past Alcohol Use History: Occasional


Additional Past Alcohol Use History / Comment(s): Smokes 1-2 cigarettes daily 

X3-4 yrs.


Past Drug Use History: None Reported


Additional Drug Use History / Comment(s): Marijuana once a month. Aware no use 

24 hrs prior to procedure.





- Past Family History


  ** Father


Family Medical History: Unable to Obtain


Additional Family Medical History / Comment(s): Patient was adopted, family hx 

unknown.





General Exam


General appearance: alert, in no apparent distress


Head exam: Present: atraumatic, normocephalic, normal inspection


Eye exam: Present: normal appearance, PERRL, EOMI.  Absent: scleral icterus, 

conjunctival injection, periorbital swelling


ENT exam: Present: normal exam, mucous membranes moist


Neck exam: Present: normal inspection, full ROM.  Absent: tenderness, 

meningismus, lymphadenopathy


Respiratory exam: Present: normal lung sounds bilaterally.  Absent: respiratory 

distress, wheezes, rales, rhonchi, stridor


Cardiovascular Exam: Present: regular rate, normal rhythm, normal heart sounds. 

Absent: systolic murmur, diastolic murmur, rubs, gallop, clicks


GI/Abdominal exam: Present: soft, normal bowel sounds.  Absent: distended, 

tenderness, guarding, rebound, rigid


Back exam: Absent: CVA tenderness (R), CVA tenderness (L)


Neurological exam: Present: alert, oriented X3


Skin exam: Present: warm, dry, intact, normal color.  Absent: rash





Course


                                   Vital Signs











  22





  21:49 03:00


 


Temperature 97.0 F L 97.5 F L


 


Pulse Rate 65 68


 


Respiratory 20 18





Rate  


 


Blood Pressure 141/71 113/57


 


O2 Sat by Pulse 98 100





Oximetry  














Medical Decision Making





- Medical Decision Making





26-year-old presented for nausea vomiting vaginal bleeding early pregnancy.  Son

shows small subchorionic hemorrhage.  Patient has been negative blood type did 

receive RhoGAM she was well-hydrated able tolerate oral intake be discharged in 

stable condition.





- Lab Data


Result diagrams: 


                                 22 02:20





                                 22 02:20


                                   Lab Results











  22 Range/Units





  02:20 02:20 02:25 


 


WBC   17.0 H   (3.8-10.6)  k/uL


 


RBC   4.42   (3.80-5.40)  m/uL


 


Hgb   13.3   (11.4-16.0)  gm/dL


 


Hct   37.9   (34.0-46.0)  %


 


MCV   85.6   (80.0-100.0)  fL


 


MCH   30.0   (25.0-35.0)  pg


 


MCHC   35.1   (31.0-37.0)  g/dL


 


RDW   12.6   (11.5-15.5)  %


 


Plt Count   328   (150-450)  k/uL


 


MPV   7.2   


 


Neutrophils %   94   %


 


Lymphocytes %   4   %


 


Monocytes %   1   %


 


Eosinophils %   0   %


 


Basophils %   0   %


 


Neutrophils #   15.9 H   (1.3-7.7)  k/uL


 


Lymphocytes #   0.7 L   (1.0-4.8)  k/uL


 


Monocytes #   0.2   (0-1.0)  k/uL


 


Eosinophils #   0.0   (0-0.7)  k/uL


 


Basophils #   0.0   (0-0.2)  k/uL


 


Sodium  137    (137-145)  mmol/L


 


Potassium  3.6    (3.5-5.1)  mmol/L


 


Chloride  99    ()  mmol/L


 


Carbon Dioxide  21 L    (22-30)  mmol/L


 


Anion Gap  17    mmol/L


 


BUN  16    (7-17)  mg/dL


 


Creatinine  0.46 L    (0.52-1.04)  mg/dL


 


Est GFR (CKD-EPI)AfAm  >90    (>60 ml/min/1.73 sqM)  


 


Est GFR (CKD-EPI)NonAf  >90    (>60 ml/min/1.73 sqM)  


 


Glucose  125 H    (74-99)  mg/dL


 


Calcium  9.9    (8.4-10.2)  mg/dL


 


Total Bilirubin  0.7    (0.2-1.3)  mg/dL


 


AST  29    (14-36)  U/L


 


ALT  24    (4-34)  U/L


 


Alkaline Phosphatase  47    ()  U/L


 


Total Protein  8.0    (6.3-8.2)  g/dL


 


Albumin  5.0    (3.5-5.0)  g/dL


 


Urine Color     


 


Urine Appearance     (Clear)  


 


Urine pH     (5.0-8.0)  


 


Ur Specific Gravity     (1.001-1.035)  


 


Urine Protein     (Negative)  


 


Urine Glucose (UA)     (Negative)  


 


Urine Ketones     (Negative)  


 


Urine Blood     (Negative)  


 


Urine Nitrite     (Negative)  


 


Urine Bilirubin     (Negative)  


 


Urine Urobilinogen     (<2.0)  mg/dL


 


Ur Leukocyte Esterase     (Negative)  


 


Urine WBC     (0-5)  /hpf


 


Ur Squamous Epith Cells     (0-4)  /hpf


 


Urine Mucus     (None)  /hpf


 


Coronavirus (PCR)    Not Detected  (Not Detectd)  














  22 Range/Units





  03:43 


 


WBC   (3.8-10.6)  k/uL


 


RBC   (3.80-5.40)  m/uL


 


Hgb   (11.4-16.0)  gm/dL


 


Hct   (34.0-46.0)  %


 


MCV   (80.0-100.0)  fL


 


MCH   (25.0-35.0)  pg


 


MCHC   (31.0-37.0)  g/dL


 


RDW   (11.5-15.5)  %


 


Plt Count   (150-450)  k/uL


 


MPV   


 


Neutrophils %   %


 


Lymphocytes %   %


 


Monocytes %   %


 


Eosinophils %   %


 


Basophils %   %


 


Neutrophils #   (1.3-7.7)  k/uL


 


Lymphocytes #   (1.0-4.8)  k/uL


 


Monocytes #   (0-1.0)  k/uL


 


Eosinophils #   (0-0.7)  k/uL


 


Basophils #   (0-0.2)  k/uL


 


Sodium   (137-145)  mmol/L


 


Potassium   (3.5-5.1)  mmol/L


 


Chloride   ()  mmol/L


 


Carbon Dioxide   (22-30)  mmol/L


 


Anion Gap   mmol/L


 


BUN   (7-17)  mg/dL


 


Creatinine   (0.52-1.04)  mg/dL


 


Est GFR (CKD-EPI)AfAm   (>60 ml/min/1.73 sqM)  


 


Est GFR (CKD-EPI)NonAf   (>60 ml/min/1.73 sqM)  


 


Glucose   (74-99)  mg/dL


 


Calcium   (8.4-10.2)  mg/dL


 


Total Bilirubin   (0.2-1.3)  mg/dL


 


AST   (14-36)  U/L


 


ALT   (4-34)  U/L


 


Alkaline Phosphatase   ()  U/L


 


Total Protein   (6.3-8.2)  g/dL


 


Albumin   (3.5-5.0)  g/dL


 


Urine Color  Yellow  


 


Urine Appearance  Clear  (Clear)  


 


Urine pH  6.0  (5.0-8.0)  


 


Ur Specific Gravity  1.042 H  (1.001-1.035)  


 


Urine Protein  2+ H  (Negative)  


 


Urine Glucose (UA)  Negative  (Negative)  


 


Urine Ketones  4+ H  (Negative)  


 


Urine Blood  Negative  (Negative)  


 


Urine Nitrite  Negative  (Negative)  


 


Urine Bilirubin  Negative  (Negative)  


 


Urine Urobilinogen  <2.0  (<2.0)  mg/dL


 


Ur Leukocyte Esterase  Negative  (Negative)  


 


Urine WBC  2  (0-5)  /hpf


 


Ur Squamous Epith Cells  2  (0-4)  /hpf


 


Urine Mucus  Many H  (None)  /hpf


 


Coronavirus (PCR)   (Not Detectd)  














Disposition


Clinical Impression: 


 Pregnancy, Subchorionic hemorrhage, Nausea & vomiting, Dehydration





Disposition: HOME SELF-CARE


Condition: Stable


Instructions (If sedation given, give patient instructions):  Nausea and 

Vomiting in Pregnancy (ED)


Additional Instructions: 


Please return to the Emergency Department if symptoms worsen or any other 

concerns.


Prescriptions: 


Ondansetron Odt [Zofran Odt] 4 mg PO Q8HR PRN #10 tab


 PRN Reason: Nausea


Is patient prescribed a controlled substance at d/c from ED?: No


Referrals: 


Duane Gill Jr,  [Primary Care Provider] - 1-2 days


Time of Disposition: 04:20

## 2022-01-03 VITALS — RESPIRATION RATE: 20 BRPM | DIASTOLIC BLOOD PRESSURE: 56 MMHG | TEMPERATURE: 98 F | SYSTOLIC BLOOD PRESSURE: 110 MMHG

## 2022-01-03 VITALS — HEART RATE: 68 BPM

## 2022-01-03 LAB
ALBUMIN SERPL-MCNC: 5 G/DL (ref 3.5–5)
ALP SERPL-CCNC: 47 U/L (ref 38–126)
ALT SERPL-CCNC: 24 U/L (ref 4–34)
ANION GAP SERPL CALC-SCNC: 17 MMOL/L
AST SERPL-CCNC: 29 U/L (ref 14–36)
BASOPHILS # BLD AUTO: 0 K/UL (ref 0–0.2)
BASOPHILS NFR BLD AUTO: 0 %
BUN SERPL-SCNC: 16 MG/DL (ref 7–17)
CALCIUM SPEC-MCNC: 9.9 MG/DL (ref 8.4–10.2)
CHLORIDE SERPL-SCNC: 99 MMOL/L (ref 98–107)
CO2 SERPL-SCNC: 21 MMOL/L (ref 22–30)
EOSINOPHIL # BLD AUTO: 0 K/UL (ref 0–0.7)
EOSINOPHIL NFR BLD AUTO: 0 %
ERYTHROCYTE [DISTWIDTH] IN BLOOD BY AUTOMATED COUNT: 4.42 M/UL (ref 3.8–5.4)
ERYTHROCYTE [DISTWIDTH] IN BLOOD: 12.6 % (ref 11.5–15.5)
GLUCOSE SERPL-MCNC: 125 MG/DL (ref 74–99)
HCT VFR BLD AUTO: 37.9 % (ref 34–46)
HGB BLD-MCNC: 13.3 GM/DL (ref 11.4–16)
KETONES UR QL STRIP.AUTO: (no result)
LYMPHOCYTES # SPEC AUTO: 0.7 K/UL (ref 1–4.8)
LYMPHOCYTES NFR SPEC AUTO: 4 %
MCH RBC QN AUTO: 30 PG (ref 25–35)
MCHC RBC AUTO-ENTMCNC: 35.1 G/DL (ref 31–37)
MCV RBC AUTO: 85.6 FL (ref 80–100)
MONOCYTES # BLD AUTO: 0.2 K/UL (ref 0–1)
MONOCYTES NFR BLD AUTO: 1 %
NEUTROPHILS # BLD AUTO: 15.9 K/UL (ref 1.3–7.7)
NEUTROPHILS NFR BLD AUTO: 94 %
PH UR: 6 [PH] (ref 5–8)
PLATELET # BLD AUTO: 328 K/UL (ref 150–450)
POTASSIUM SERPL-SCNC: 3.6 MMOL/L (ref 3.5–5.1)
PROT SERPL-MCNC: 8 G/DL (ref 6.3–8.2)
PROT UR QL: (no result)
SODIUM SERPL-SCNC: 137 MMOL/L (ref 137–145)
SP GR UR: 1.04 (ref 1–1.03)
SQUAMOUS UR QL AUTO: 2 /HPF (ref 0–4)
UROBILINOGEN UR QL STRIP: <2 MG/DL (ref ?–2)
WBC # BLD AUTO: 17 K/UL (ref 3.8–10.6)
WBC # UR AUTO: 2 /HPF (ref 0–5)

## 2022-01-15 ENCOUNTER — HOSPITAL ENCOUNTER (EMERGENCY)
Dept: HOSPITAL 47 - EC | Age: 27
Discharge: HOME | End: 2022-01-15
Payer: COMMERCIAL

## 2022-01-15 VITALS — SYSTOLIC BLOOD PRESSURE: 119 MMHG | RESPIRATION RATE: 18 BRPM | DIASTOLIC BLOOD PRESSURE: 66 MMHG | HEART RATE: 72 BPM

## 2022-01-15 VITALS — TEMPERATURE: 98.2 F

## 2022-01-15 DIAGNOSIS — Z3A.11: ICD-10-CM

## 2022-01-15 DIAGNOSIS — O20.0: Primary | ICD-10-CM

## 2022-01-15 DIAGNOSIS — F17.200: ICD-10-CM

## 2022-01-15 DIAGNOSIS — O99.331: ICD-10-CM

## 2022-01-15 LAB
ALBUMIN SERPL-MCNC: 4.4 G/DL (ref 3.5–5)
ALP SERPL-CCNC: 41 U/L (ref 38–126)
ALT SERPL-CCNC: 17 U/L (ref 4–34)
ANION GAP SERPL CALC-SCNC: 6 MMOL/L
AST SERPL-CCNC: 21 U/L (ref 14–36)
BASOPHILS # BLD AUTO: 0 K/UL (ref 0–0.2)
BASOPHILS NFR BLD AUTO: 0 %
BUN SERPL-SCNC: 9 MG/DL (ref 7–17)
CALCIUM SPEC-MCNC: 9.3 MG/DL (ref 8.4–10.2)
CHLORIDE SERPL-SCNC: 102 MMOL/L (ref 98–107)
CO2 SERPL-SCNC: 26 MMOL/L (ref 22–30)
EOSINOPHIL # BLD AUTO: 0.2 K/UL (ref 0–0.7)
EOSINOPHIL NFR BLD AUTO: 2 %
ERYTHROCYTE [DISTWIDTH] IN BLOOD BY AUTOMATED COUNT: 4.46 M/UL (ref 3.8–5.4)
ERYTHROCYTE [DISTWIDTH] IN BLOOD: 13.6 % (ref 11.5–15.5)
GLUCOSE SERPL-MCNC: 69 MG/DL (ref 74–99)
HCT VFR BLD AUTO: 39.6 % (ref 34–46)
HGB BLD-MCNC: 13.6 GM/DL (ref 11.4–16)
LYMPHOCYTES # SPEC AUTO: 1.8 K/UL (ref 1–4.8)
LYMPHOCYTES NFR SPEC AUTO: 16 %
MCH RBC QN AUTO: 30.4 PG (ref 25–35)
MCHC RBC AUTO-ENTMCNC: 34.3 G/DL (ref 31–37)
MCV RBC AUTO: 88.8 FL (ref 80–100)
MONOCYTES # BLD AUTO: 0.5 K/UL (ref 0–1)
MONOCYTES NFR BLD AUTO: 4 %
NEUTROPHILS # BLD AUTO: 8.5 K/UL (ref 1.3–7.7)
NEUTROPHILS NFR BLD AUTO: 76 %
PH UR: 6.5 [PH] (ref 5–8)
PLATELET # BLD AUTO: 310 K/UL (ref 150–450)
POTASSIUM SERPL-SCNC: 3.8 MMOL/L (ref 3.5–5.1)
PROT SERPL-MCNC: 7.3 G/DL (ref 6.3–8.2)
SODIUM SERPL-SCNC: 134 MMOL/L (ref 137–145)
SP GR UR: 1.01 (ref 1–1.03)
UROBILINOGEN UR QL STRIP: <2 MG/DL (ref ?–2)
WBC # BLD AUTO: 11.1 K/UL (ref 3.8–10.6)

## 2022-01-15 PROCEDURE — 84702 CHORIONIC GONADOTROPIN TEST: CPT

## 2022-01-15 PROCEDURE — 81003 URINALYSIS AUTO W/O SCOPE: CPT

## 2022-01-15 PROCEDURE — 87808 TRICHOMONAS ASSAY W/OPTIC: CPT

## 2022-01-15 PROCEDURE — 96360 HYDRATION IV INFUSION INIT: CPT

## 2022-01-15 PROCEDURE — 87491 CHLMYD TRACH DNA AMP PROBE: CPT

## 2022-01-15 PROCEDURE — 80053 COMPREHEN METABOLIC PANEL: CPT

## 2022-01-15 PROCEDURE — 76801 OB US < 14 WKS SINGLE FETUS: CPT

## 2022-01-15 PROCEDURE — 36415 COLL VENOUS BLD VENIPUNCTURE: CPT

## 2022-01-15 PROCEDURE — 85025 COMPLETE CBC W/AUTO DIFF WBC: CPT

## 2022-01-15 PROCEDURE — 99284 EMERGENCY DEPT VISIT MOD MDM: CPT

## 2022-01-15 PROCEDURE — 87591 N.GONORRHOEAE DNA AMP PROB: CPT

## 2022-01-15 NOTE — ED
General Adult HPI





- General


Chief complaint: Abdominal Pain


Stated complaint: Vaginal Bleeding, 11 Weeks


Time Seen by Provider: 01/15/22 06:53


Source: patient, RN notes reviewed


Mode of arrival: ambulatory


Limitations: no limitations





- History of Present Illness


Initial comments: 





This is a pleasant 26-year-old female who presents [facility] with vaginal 

discharge and pelvic pain.  Patient is  A0.  Patient states she was seen 

here a few weeks ago and had a roll damn shot.  Patient has had an ultrasound 

during this pregnancy.  She is describing sharp cramping pain to the right 

pelvis with brown vaginal discharge.  Patient's also had cramping which is 

normal and the entire pregnancy.  Patient's obstetrician is Dr. Mckeon. patient 

has no other significant past medical history.


No headache, no fever or chills, no changes in vision or hearing, no sore throat

or difficulty with speech, no neck pain, no chest pain or shortness of breath, 

no abdominal pain, no nausea or vomiting, no changes in urination or bowel mov

ements, no numbness or tingling, no extremity pain, no skin rashes or lesions.





- Related Data


                                  Previous Rx's











 Medication  Instructions  Recorded


 


Ondansetron Odt [Zofran Odt] 4 mg PO Q8HR PRN #10 tab 22











                                    Allergies











Allergy/AdvReac Type Severity Reaction Status Date / Time


 


No Known Allergies Allergy   Verified 01/15/22 06:58














Review of Systems


ROS Statement: 


Those systems with pertinent positive or pertinent negative responses have been 

documented in the HPI.





ROS Other: All systems not noted in ROS Statement are negative.





Past Medical History


Past Medical History: No Reported History


History of Any Multi-Drug Resistant Organisms: None Reported


Past Surgical History: Ear Surgery


Past Anesthesia/Blood Transfusion Reactions: No Reported Reaction, Motion 

Sickness


Past Psychological History: Anxiety, Depression


Smoking Status: Current every day smoker


Past Alcohol Use History: Occasional


Past Drug Use History: None Reported





- Past Family History


  ** Father


Family Medical History: Unable to Obtain


Additional Family Medical History / Comment(s): Patient was adopted, family hx 

unknown.





General Exam





- General Exam Comments


Initial Comments: 





Nontoxic appearing female in no acute distress


Limitations: no limitations


General appearance: alert, in no apparent distress


Head exam: Present: atraumatic, normocephalic, normal inspection


Eye exam: Present: normal appearance, PERRL, EOMI.  Absent: scleral icterus, 

conjunctival injection, periorbital swelling


ENT exam: Present: normal exam, mucous membranes moist


Neck exam: Present: normal inspection.  Absent: tenderness, meningismus, 

lymphadenopathy


Respiratory exam: Present: normal lung sounds bilaterally.  Absent: respiratory 

distress, wheezes, rales, rhonchi, stridor


Cardiovascular Exam: Present: regular rate, normal rhythm, normal heart sounds. 

Absent: systolic murmur, diastolic murmur, rubs, gallop, clicks


GI/Abdominal exam: Present: soft, normal bowel sounds, other (Patient has some 

tenderness to the area of the right pelvis and suprapubic area with some 

guarding.  No rebound or percussion tenderness.).  Absent: distended, 

tenderness, guarding, rebound, rigid


External exam: Present: normal external exam


Speculum exam: Present: vaginal discharge, cervical discharge, other (Chaperoned

by female RN).  Absent: erythema, vaginal bleeding, foreign body, tissue


By manual exam: Present: normal by manual exam


Extremities exam: Present: normal inspection, full ROM, normal capillary refill.

 Absent: tenderness, pedal edema, joint swelling, calf tenderness


Back exam: Present: normal inspection


Neurological exam: Present: alert, oriented X3, CN II-XII intact


Psychiatric exam: Present: normal affect, normal mood


Skin exam: Present: warm, dry, intact, normal color.  Absent: rash





Course


                                   Vital Signs











  01/15/22





  06:55


 


Temperature 98.2 F


 


Pulse Rate 109 H


 


Respiratory 16





Rate 


 


Blood Pressure 146/88


 


O2 Sat by Pulse 98





Oximetry 














- Reevaluation(s)


Reevaluation #1: 





01/15/22 09:04


Patient reevaluated is resting comfortably in bed.  Hemodynamically stable.  No 

vaginal bleeding.





Medical Decision Making





- Medical Decision Making





Differential diagnosis includes spontaneous miscarriage, subchorionic 

hemorrhage, infectious etiology, unlikely to be ectopic pregnancy given the fact

that the patient is 11 weeks pregnant and she previously has had an ultrasound 

showing an IUP.  Neck infection is possible.  Plan for diagnostic investigation 

and reevaluation.





Viable intrauterine pregnancy measuring 11 weeks 3 days, 2.9 cm subchorionic 

hemorrhage, fetal heart rate 169.





Patient was told to return to the ER for any signs or symptoms worsen.  Told to 

return immediately if any other problems arise.  All questions answered.  

Treatment plan discussed.  Patient in agreement





The case was discussed in detail with ED attending physician.  Presentation, 

findings, treatment plan discussed in detail.





Note that the patient received RhoGAM at the previous visit





Patient counseled on findings.  Patient counseled on restrictions.  Advised 

pelvic rest.





- Lab Data


Result diagrams: 


                                 01/15/22 07:22





                                 01/15/22 07:22


                                   Lab Results











  01/15/22 01/15/22 01/15/22 Range/Units





  07:22 07:22 07:22 


 


WBC  11.1 H    (3.8-10.6)  k/uL


 


RBC  4.46    (3.80-5.40)  m/uL


 


Hgb  13.6    (11.4-16.0)  gm/dL


 


Hct  39.6    (34.0-46.0)  %


 


MCV  88.8    (80.0-100.0)  fL


 


MCH  30.4    (25.0-35.0)  pg


 


MCHC  34.3    (31.0-37.0)  g/dL


 


RDW  13.6    (11.5-15.5)  %


 


Plt Count  310    (150-450)  k/uL


 


MPV  6.8    


 


Neutrophils %  76    %


 


Lymphocytes %  16    %


 


Monocytes %  4    %


 


Eosinophils %  2    %


 


Basophils %  0    %


 


Neutrophils #  8.5 H    (1.3-7.7)  k/uL


 


Lymphocytes #  1.8    (1.0-4.8)  k/uL


 


Monocytes #  0.5    (0-1.0)  k/uL


 


Eosinophils #  0.2    (0-0.7)  k/uL


 


Basophils #  0.0    (0-0.2)  k/uL


 


Sodium    134 L  (137-145)  mmol/L


 


Potassium    3.8  (3.5-5.1)  mmol/L


 


Chloride    102  ()  mmol/L


 


Carbon Dioxide    26  (22-30)  mmol/L


 


Anion Gap    6  mmol/L


 


BUN    9  (7-17)  mg/dL


 


Creatinine    0.52  (0.52-1.04)  mg/dL


 


Est GFR (CKD-EPI)AfAm    >90  (>60 ml/min/1.73 sqM)  


 


Est GFR (CKD-EPI)NonAf    >90  (>60 ml/min/1.73 sqM)  


 


Glucose    69 L  (74-99)  mg/dL


 


Calcium    9.3  (8.4-10.2)  mg/dL


 


Total Bilirubin    0.5  (0.2-1.3)  mg/dL


 


AST    21  (14-36)  U/L


 


ALT    17  (4-34)  U/L


 


Alkaline Phosphatase    41  ()  U/L


 


Total Protein    7.3  (6.3-8.2)  g/dL


 


Albumin    4.4  (3.5-5.0)  g/dL


 


Urine Color   Light Yellow   


 


Urine Appearance   Clear   (Clear)  


 


Urine pH   6.5   (5.0-8.0)  


 


Ur Specific Gravity   1.015   (1.001-1.035)  


 


Urine Protein   Negative   (Negative)  


 


Urine Glucose (UA)   Negative   (Negative)  


 


Urine Ketones   Negative   (Negative)  


 


Urine Blood   Negative   (Negative)  


 


Urine Nitrite   Negative   (Negative)  


 


Urine Bilirubin   Negative   (Negative)  


 


Urine Urobilinogen   <2.0   (<2.0)  mg/dL


 


Ur Leukocyte Esterase   Negative   (Negative)  


 


Trichomonas Ag (Rapid)     (Negative)  














  01/15/22 Range/Units





  07:22 


 


WBC   (3.8-10.6)  k/uL


 


RBC   (3.80-5.40)  m/uL


 


Hgb   (11.4-16.0)  gm/dL


 


Hct   (34.0-46.0)  %


 


MCV   (80.0-100.0)  fL


 


MCH   (25.0-35.0)  pg


 


MCHC   (31.0-37.0)  g/dL


 


RDW   (11.5-15.5)  %


 


Plt Count   (150-450)  k/uL


 


MPV   


 


Neutrophils %   %


 


Lymphocytes %   %


 


Monocytes %   %


 


Eosinophils %   %


 


Basophils %   %


 


Neutrophils #   (1.3-7.7)  k/uL


 


Lymphocytes #   (1.0-4.8)  k/uL


 


Monocytes #   (0-1.0)  k/uL


 


Eosinophils #   (0-0.7)  k/uL


 


Basophils #   (0-0.2)  k/uL


 


Sodium   (137-145)  mmol/L


 


Potassium   (3.5-5.1)  mmol/L


 


Chloride   ()  mmol/L


 


Carbon Dioxide   (22-30)  mmol/L


 


Anion Gap   mmol/L


 


BUN   (7-17)  mg/dL


 


Creatinine   (0.52-1.04)  mg/dL


 


Est GFR (CKD-EPI)AfAm   (>60 ml/min/1.73 sqM)  


 


Est GFR (CKD-EPI)NonAf   (>60 ml/min/1.73 sqM)  


 


Glucose   (74-99)  mg/dL


 


Calcium   (8.4-10.2)  mg/dL


 


Total Bilirubin   (0.2-1.3)  mg/dL


 


AST   (14-36)  U/L


 


ALT   (4-34)  U/L


 


Alkaline Phosphatase   ()  U/L


 


Total Protein   (6.3-8.2)  g/dL


 


Albumin   (3.5-5.0)  g/dL


 


Urine Color   


 


Urine Appearance   (Clear)  


 


Urine pH   (5.0-8.0)  


 


Ur Specific Gravity   (1.001-1.035)  


 


Urine Protein   (Negative)  


 


Urine Glucose (UA)   (Negative)  


 


Urine Ketones   (Negative)  


 


Urine Blood   (Negative)  


 


Urine Nitrite   (Negative)  


 


Urine Bilirubin   (Negative)  


 


Urine Urobilinogen   (<2.0)  mg/dL


 


Ur Leukocyte Esterase   (Negative)  


 


Trichomonas Ag (Rapid)  Negative  (Negative)  














Disposition


Clinical Impression: 


 Pelvic pain affecting pregnancy in first trimester, antepartum, Threatened 

miscarriage in early pregnancy, Subchorionic hemorrhage in first trimester





Disposition: HOME SELF-CARE


Condition: Good


Instructions (If sedation given, give patient instructions):  Threatened 

Miscarriage (ED)


Additional Instructions: 


Follow-up with your obstetrician, Dr. Mckeon as directed.





Follow-up with your regular physician as directed.  Return to the ER immediately

if any symptoms worsen, new symptoms arise, or any other problems develop.


Is patient prescribed a controlled substance at d/c from ED?: No


Referrals: 


Bernice Mckeon DO [Doctor of Osteopathic Medicine] - 22


Time of Disposition: 09:06

## 2022-01-15 NOTE — US
EXAMINATION TYPE: Transabdominal

 

DATE OF EXAM: 1/15/2022 8:18 AM

 

COMPARISON: US 2021

 

CLINICAL HISTORY: Pelvic pain. Bleeding and pelvic cramping

 

EXAM PERFORMED:  Transabdominal (TA)

 

EXAM MEASUREMENTS:

 

GESTATIONAL AGE / DATING

 

Physician Established: Not yet established 

Dates by LMP: (9 weeks/6 days)  

** EDC: 08/14/2022

Dates by First Scan:  (11 weeks/1 days)  

** EDC: 08/05/2022

Dates by Current Scan for: (11 weeks/3 days)  

** EDC: 08/03/2022

 

MATERNAL ANATOMY 

 

Uterus: 9.6 x 7.3 x 9.3cm, anteverted

Right Ovary: 3.4 x 1.9 x 1.9cm

Left Ovary: 3.2 x 1.2 x 2.6cm

Post CDS / Adnexa: wnl

Presence of free fluid: no

Presence of corpus luteal cyst: right ovary - 2.1cm 

Presence of subchorionic bleed: right of gestational sac - 2.9 x 1.3 x 1.0cm

 

GESTATION / FETAL SURVEY

 

CRL: 4.5cm (11 weeks/3 days)

Yolk Sac (normal less than 6mm): not seen

Heart Rate: 169 bpm

Rhythm:  Normal

IUP:  Viable IUP

 

Date of LMP: Patient unsure

Beta HcG (if available):  Not available at time of exam

 

Viable single IUP measuring 11 weeks 3 days with a heart rate of 169bpm and an estimated delivery pietro
e of 08/03/2022, 2.9cm subchorionic bleed seen 

 

 

IMPRESSION:

1.Single viable intrauterine pregnancy with an estimated age of 11 weeks and 3 days.

2. 2.9 cm subchorionic hemorrhage.

## 2022-04-24 ENCOUNTER — HOSPITAL ENCOUNTER (OUTPATIENT)
Dept: HOSPITAL 47 - FBPOP | Age: 27
Discharge: HOME | End: 2022-04-24
Attending: OBSTETRICS & GYNECOLOGY
Payer: COMMERCIAL

## 2022-04-24 VITALS
SYSTOLIC BLOOD PRESSURE: 128 MMHG | HEART RATE: 83 BPM | DIASTOLIC BLOOD PRESSURE: 69 MMHG | RESPIRATION RATE: 16 BRPM | TEMPERATURE: 98.5 F

## 2022-04-24 DIAGNOSIS — Z3A.24: ICD-10-CM

## 2022-04-24 DIAGNOSIS — O21.9: Primary | ICD-10-CM

## 2022-04-24 LAB
KETONES UR QL STRIP.AUTO: (no result)
PH UR: 6 [PH] (ref 5–8)
PROT UR QL: (no result)
RBC UR QL: 1 /HPF (ref 0–5)
SP GR UR: 1.04 (ref 1–1.03)
SQUAMOUS UR QL AUTO: 3 /HPF (ref 0–4)
UROBILINOGEN UR QL STRIP: 2 MG/DL (ref ?–2)
WBC #/AREA URNS HPF: 3 /HPF (ref 0–5)

## 2022-04-24 PROCEDURE — 81001 URINALYSIS AUTO W/SCOPE: CPT

## 2022-04-24 PROCEDURE — 96361 HYDRATE IV INFUSION ADD-ON: CPT

## 2022-04-24 PROCEDURE — 99214 OFFICE O/P EST MOD 30 MIN: CPT

## 2022-04-24 PROCEDURE — 96365 THER/PROPH/DIAG IV INF INIT: CPT

## 2022-04-24 RX ADMIN — POTASSIUM CHLORIDE SCH MLS/HR: 14.9 INJECTION, SOLUTION INTRAVENOUS at 17:11

## 2022-04-24 RX ADMIN — POTASSIUM CHLORIDE SCH MLS/HR: 14.9 INJECTION, SOLUTION INTRAVENOUS at 17:52

## 2022-04-24 RX ADMIN — POTASSIUM CHLORIDE SCH MLS/HR: 14.9 INJECTION, SOLUTION INTRAVENOUS at 17:31

## 2022-04-27 NOTE — P.MSEPDOC
Presenting Problems





- Arrival Data


Date of Arrival on Unit: 22


Time of Arrival on Unit: 16:38


Mode of Transport: Ambulatory





- Complaint


OB-Reason for Admission/Chief Complaint: Acute Nausea/Vomiting


Comment: pt arrived c/o vomitting and diarrhea since last night and not able to 

keep anything down





Prenatal Medical History





- Pregnancy Information


: 2


Para: 1


Term: 1


: 0


Abortions: Spontaneous or Elective: 0


Number of Living Children: 1





- Gestational Age


Gestational Age by TAZ (wks/days): 24 Weeks and 5 Days





Review of Systems





- Review of Systems


Constitutional: No problems


Breast: No problems


ENT: No problems


Cardiovascular: No problems


Respiratory: No problems


Gastrointestinal: No problems


Genitourinary: No problems


Musculoskeletal: No problems


Neurological: No problems


Skin: No problems





Vital Signs





- Temperature


Temperature: 98.5 F


Temperature Source: Oral





- Pulse


  ** Right Brachial


Pulse Rate: 83


Pulse Assessment Method: Automatic Cuff





- Respirations


Respiratory Rate: 16


Oxygen Delivery Method: Room Air


O2 Sat by Pulse Oximetry: 97





- Blood Pressure


  ** Right Arm


Blood Pressure: 128/69


Blood Pressure Mean: 88


Blood Pressure Source: Automatic Cuff





Medical Screen Scoring





- Cervical Exam


Membranes: Intact





- Fetal Assessment - Baby A


Baseline FHR: 150


Fetal Heart Rate - NICHD Category: Category I (Normal)





Physician Notification





- Physician Notified


Physician Notified Date: 22


Physician Notified Time: 16:50


Physician: Dr Elli Felix Order Received: Yes





- Notification Comment


Comment: obtain a clean catch u/a send to lab. ua 4+ ketones.  start iv of lr 

and insue and given zofran 4 mg ivp





Maternal Fetal Triage Index





- Non-Urgent/Priority 4


Non-Urgent Priority 4: Yes


Criteria Met for Priority 4: pt 24 5/7 weeks c/o nausea and vomitting and 

diarrhea since last night





Disposition





- Disposition


OB Disposition: Discharge to home


Discharge Date: 22


Discharge Time: 18:31


I agree with the RN Medical Screening Exam: Yes


Case reviewed; plan agreed upon as documented in EMR&OBIX.: Yes


Diagnosis: VOMITING OF PREGNANCY, UNSPECIFIED

## 2022-05-30 ENCOUNTER — HOSPITAL ENCOUNTER (OUTPATIENT)
Dept: HOSPITAL 47 - FBPOP | Age: 27
Discharge: HOME | End: 2022-05-30
Attending: OBSTETRICS & GYNECOLOGY
Payer: COMMERCIAL

## 2022-05-30 VITALS
SYSTOLIC BLOOD PRESSURE: 125 MMHG | DIASTOLIC BLOOD PRESSURE: 64 MMHG | TEMPERATURE: 97.4 F | RESPIRATION RATE: 18 BRPM | HEART RATE: 85 BPM

## 2022-05-30 DIAGNOSIS — Z3A.29: ICD-10-CM

## 2022-05-30 DIAGNOSIS — O99.282: Primary | ICD-10-CM

## 2022-05-30 LAB
PH UR: 6 [PH] (ref 5–8)
RBC UR QL: 2 /HPF (ref 0–5)
SP GR UR: 1.03 (ref 1–1.03)
SQUAMOUS UR QL AUTO: 10 /HPF (ref 0–4)
UROBILINOGEN UR QL STRIP: <2 MG/DL (ref ?–2)
WBC #/AREA URNS HPF: 34 /HPF (ref 0–5)

## 2022-05-30 PROCEDURE — 81001 URINALYSIS AUTO W/SCOPE: CPT

## 2022-05-30 PROCEDURE — 59025 FETAL NON-STRESS TEST: CPT

## 2022-05-30 PROCEDURE — 96374 THER/PROPH/DIAG INJ IV PUSH: CPT

## 2022-05-30 PROCEDURE — 99214 OFFICE O/P EST MOD 30 MIN: CPT

## 2022-05-30 PROCEDURE — 96361 HYDRATE IV INFUSION ADD-ON: CPT

## 2022-07-05 ENCOUNTER — HOSPITAL ENCOUNTER (OUTPATIENT)
Dept: HOSPITAL 47 - FBPOP | Age: 27
Discharge: HOME | End: 2022-07-05
Attending: OBSTETRICS & GYNECOLOGY
Payer: COMMERCIAL

## 2022-07-05 VITALS
RESPIRATION RATE: 16 BRPM | HEART RATE: 75 BPM | SYSTOLIC BLOOD PRESSURE: 125 MMHG | DIASTOLIC BLOOD PRESSURE: 74 MMHG | TEMPERATURE: 98.5 F

## 2022-07-05 DIAGNOSIS — O26.893: Primary | ICD-10-CM

## 2022-07-05 DIAGNOSIS — Z3A.35: ICD-10-CM

## 2022-07-05 PROCEDURE — 96360 HYDRATION IV INFUSION INIT: CPT

## 2022-07-05 PROCEDURE — 87635 SARS-COV-2 COVID-19 AMP PRB: CPT

## 2022-07-05 PROCEDURE — 99215 OFFICE O/P EST HI 40 MIN: CPT

## 2022-07-05 PROCEDURE — 87502 INFLUENZA DNA AMP PROBE: CPT

## 2022-07-05 PROCEDURE — 96361 HYDRATE IV INFUSION ADD-ON: CPT

## 2022-07-05 PROCEDURE — 59025 FETAL NON-STRESS TEST: CPT

## 2022-07-05 RX ADMIN — POTASSIUM CHLORIDE SCH MLS/HR: 14.9 INJECTION, SOLUTION INTRAVENOUS at 15:24

## 2022-07-05 RX ADMIN — POTASSIUM CHLORIDE SCH MLS/HR: 14.9 INJECTION, SOLUTION INTRAVENOUS at 15:02

## 2022-07-07 NOTE — P.MSEPDOC
Presenting Problems





- Arrival Data


Date of Arrival on Unit: 22


Time of Arrival on Unit: 14:03


Mode of Transport: Ambulatory





- Complaint


OB-Reason for Admission/Chief Complaint: Acute Nausea/Vomiting, Headache, 

Dizziness


Comment: Pt arrives to triage c/o headache, nausea/vomitting, blurred vision 

cramping





Prenatal Medical History





- Pregnancy Information


: 2


Para: 1


Term: 1


Number of Living Children: 1





- Gestational Age


Gestational Age by TAZ (wks/days): 35 Weeks and 0 Days





Review of Systems





- Review of Systems


Constitutional: No problems


Breast: No problems


ENT: No problems


Cardiovascular: No problems


Respiratory: No problems


Gastrointestinal: No problems


Genitourinary: No problems


Musculoskeletal: No problems


Neurological: No problems


Skin: No problems





Vital Signs





- Temperature


Temperature: 98.5 F


Temperature Source: Temporal Artery Scan





- Pulse


  ** Right Sitting Brachial


Pulse Rate: 75


Pulse Assessment Method: Automatic Cuff





- Respirations


Respiratory Rate: 16


Oxygen Delivery Method: Room Air


O2 Sat by Pulse Oximetry: 99





- Blood Pressure


  ** Right Arm Sitting


Blood Pressure: 125/74


Blood Pressure Mean: 91


Blood Pressure Source: Automatic Cuff





Medical Screen Scoring





- Cervical Exam


Dilation (cm): 0


Effacement (%): 50





- Uterine Contractions


Frequency From (mins): 2


Frequency To (mins): 3


Duration From (seconds): 40


Duration To (seconds): 50


Intensity: Mild


Resting: Soft to palpation





- Fetal Assessment - Baby A


Baseline FHR: 135


Fetal Heart Rate - NICHD Category: Category I (Normal)


NST: Reactive





Physician Notification





- Physician Notified


Physician Notified Date: 22


Physician Notified Time: 17:00


Physician: Bernice Mckeon


New Order Received: Yes





- Notification Comment


Comment: Dr. Mckeon called at office, report given on maternal and fetal 

status,.  complaints of HA, blurred vision, N/V, and cramping x3days. /74,

SVE closed/50/-2.  Orders to start IV and give 2L bolus of LR.  -Bolus 

administered; pt continues to contract q2-3min, states no change in symptomes. 

Ordered COVID and Influenza swab.  -COVID and Influenza negative. Order rec'd to

discharge home.





Maternal Fetal Triage Index





- Maternal Fetal Triage Index


Presenting for scheduled procedure w/no complaint: No





- Stat/Priority 1


Stat Priority 1: No





- Urgent/Priority 2


Urgent Priority 2: Yes


Provider Notified: Bernice Mckeon


Provider Notified Time: 17:00


Criteria Met for Priority 2: HA, blurred vision, nausea/vomitting, cramping





Disposition





- Disposition


OB Disposition: Discharge to home, Written follow up instructions reviewed


Discharge Date: 22


Discharge Time: 15:10


I agree with the RN Medical Screening Exam: Yes


Case reviewed; plan agreed upon as documented in EMR&OBIX.: Yes


Diagnosis: HEADACHE, UNSPECIFIED

## 2022-07-27 ENCOUNTER — HOSPITAL ENCOUNTER (OUTPATIENT)
Dept: HOSPITAL 47 - FBPOP | Age: 27
LOS: 1 days | Discharge: HOME | End: 2022-07-28
Attending: OBSTETRICS & GYNECOLOGY
Payer: COMMERCIAL

## 2022-07-27 DIAGNOSIS — Z3A.38: ICD-10-CM

## 2022-07-27 DIAGNOSIS — O47.1: Primary | ICD-10-CM

## 2022-07-27 PROCEDURE — 99213 OFFICE O/P EST LOW 20 MIN: CPT

## 2022-07-27 PROCEDURE — 59025 FETAL NON-STRESS TEST: CPT

## 2022-07-28 VITALS
HEART RATE: 90 BPM | TEMPERATURE: 98.2 F | SYSTOLIC BLOOD PRESSURE: 137 MMHG | DIASTOLIC BLOOD PRESSURE: 85 MMHG | RESPIRATION RATE: 16 BRPM

## 2022-08-01 ENCOUNTER — HOSPITAL ENCOUNTER (INPATIENT)
Dept: HOSPITAL 47 - FBPOP | Age: 27
LOS: 1 days | Discharge: HOME | End: 2022-08-02
Attending: OBSTETRICS & GYNECOLOGY | Admitting: OBSTETRICS & GYNECOLOGY
Payer: COMMERCIAL

## 2022-08-01 DIAGNOSIS — Z3A.38: ICD-10-CM

## 2022-08-01 DIAGNOSIS — Z67.91: ICD-10-CM

## 2022-08-01 DIAGNOSIS — D72.829: ICD-10-CM

## 2022-08-01 DIAGNOSIS — Z28.310: ICD-10-CM

## 2022-08-01 DIAGNOSIS — O26.893: ICD-10-CM

## 2022-08-01 LAB
BASOPHILS # BLD AUTO: 0.1 K/UL (ref 0–0.2)
BASOPHILS NFR BLD AUTO: 0 %
EOSINOPHIL # BLD AUTO: 0.2 K/UL (ref 0–0.7)
EOSINOPHIL NFR BLD AUTO: 1 %
ERYTHROCYTE [DISTWIDTH] IN BLOOD BY AUTOMATED COUNT: 4.69 M/UL (ref 3.8–5.4)
ERYTHROCYTE [DISTWIDTH] IN BLOOD: 12.7 % (ref 11.5–15.5)
HCT VFR BLD AUTO: 40.4 % (ref 34–46)
HGB BLD-MCNC: 13.2 GM/DL (ref 11.4–16)
LYMPHOCYTES # SPEC AUTO: 2 K/UL (ref 1–4.8)
LYMPHOCYTES NFR SPEC AUTO: 9 %
MCH RBC QN AUTO: 28.1 PG (ref 25–35)
MCHC RBC AUTO-ENTMCNC: 32.6 G/DL (ref 31–37)
MCV RBC AUTO: 86.2 FL (ref 80–100)
MONOCYTES # BLD AUTO: 1.1 K/UL (ref 0–1)
MONOCYTES NFR BLD AUTO: 5 %
NEUTROPHILS # BLD AUTO: 17.8 K/UL (ref 1.3–7.7)
NEUTROPHILS NFR BLD AUTO: 83 %
PLATELET # BLD AUTO: 357 K/UL (ref 150–450)
WBC # BLD AUTO: 21.3 K/UL (ref 3.8–10.6)

## 2022-08-01 PROCEDURE — 86850 RBC ANTIBODY SCREEN: CPT

## 2022-08-01 PROCEDURE — 3E0334Z INTRODUCTION OF SERUM, TOXOID AND VACCINE INTO PERIPHERAL VEIN, PERCUTANEOUS APPROACH: ICD-10-PCS

## 2022-08-01 PROCEDURE — 85461 HEMOGLOBIN FETAL: CPT

## 2022-08-01 PROCEDURE — 00HU33Z INSERTION OF INFUSION DEVICE INTO SPINAL CANAL, PERCUTANEOUS APPROACH: ICD-10-PCS

## 2022-08-01 PROCEDURE — 99213 OFFICE O/P EST LOW 20 MIN: CPT

## 2022-08-01 PROCEDURE — 86901 BLOOD TYPING SEROLOGIC RH(D): CPT

## 2022-08-01 PROCEDURE — 59025 FETAL NON-STRESS TEST: CPT

## 2022-08-01 PROCEDURE — 85025 COMPLETE CBC W/AUTO DIFF WBC: CPT

## 2022-08-01 PROCEDURE — 86900 BLOOD TYPING SEROLOGIC ABO: CPT

## 2022-08-01 PROCEDURE — 3E0R3NZ INTRODUCTION OF ANALGESICS, HYPNOTICS, SEDATIVES INTO SPINAL CANAL, PERCUTANEOUS APPROACH: ICD-10-PCS

## 2022-08-01 PROCEDURE — 10907ZC DRAINAGE OF AMNIOTIC FLUID, THERAPEUTIC FROM PRODUCTS OF CONCEPTION, VIA NATURAL OR ARTIFICIAL OPENING: ICD-10-PCS

## 2022-08-01 PROCEDURE — 0HQ9XZZ REPAIR PERINEUM SKIN, EXTERNAL APPROACH: ICD-10-PCS

## 2022-08-01 RX ADMIN — POTASSIUM CHLORIDE SCH MLS/HR: 14.9 INJECTION, SOLUTION INTRAVENOUS at 16:16

## 2022-08-01 RX ADMIN — DOCUSATE SODIUM AND SENNOSIDES SCH: 50; 8.6 TABLET ORAL at 20:36

## 2022-08-01 RX ADMIN — POTASSIUM CHLORIDE SCH MLS/HR: 14.9 INJECTION, SOLUTION INTRAVENOUS at 16:23

## 2022-08-01 NOTE — P.MSEPDOC
Presenting Problems





- Arrival Data


Date of Arrival on Unit: 22


Time of Arrival on Unit: 15:40


Mode of Transport: Wheelchair





- Complaint


OB-Reason for Admission/Chief Complaint: Possible Onset of Labor





Prenatal Medical History





- Pregnancy Information


: 2


Para: 1


Term: 1


: 0


Abortions: Spontaneous or Elective: 0


Number of Living Children: 1





- Gestational Age


Gestational Age by TAZ (wks/days): 38 Weeks and 6 Days





Review of Systems





- Review of Systems


Constitutional: No problems


Breast: No problems


ENT: No problems


Cardiovascular: No problems


Respiratory: No problems


Gastrointestinal: No problems


Genitourinary: No problems


Musculoskeletal: No problems


Neurological: No problems


Skin: No problems





Vital Signs





- Temperature


Temperature: 97.8 F


Temperature Source: Axillary





- Pulse


  ** Right


Pulse Rate: 88


Pulse Assessment Method: Automatic Cuff





- Respirations


Respiratory Rate: 18


Oxygen Delivery Method: Room Air


O2 Sat by Pulse Oximetry: 97





Medical Screen Scoring





- Cervical Exam


Dilation (cm): 7


Effacement (%): 80


Station: -1


Membranes: Intact





- Uterine Contractions


Frequency From (mins): 1


Frequency To (mins): 3


Duration From (seconds): 60


Duration To (seconds): 70


Intensity: Strong


Resting: Soft to palpation





- Fetal Assessment - Baby A


Baseline FHR: 120


Fetal Heart Rate - NICHD Category: Category I (Normal)


NST: Reactive





Maternal Fetal Triage Index





- Maternal Fetal Triage Index


Presenting for scheduled procedure w/no complaint: No





- Stat/Priority 1


Stat Priority 1: No





- Urgent/Priority 2


Urgent Priority 2: Yes


Provider Notified: John Serra


Provider Notified Time: 15:40


Criteria Met for Priority 2: admit





Disposition





- Disposition


OB Disposition: Admit


I agree with the RN Medical Screening Exam: Yes


Case reviewed; plan agreed upon as documented in EMR&OBIX.: Yes


Diagnosis: ENCOUNTER FOR FULL-TERM UNCOMPLICATED DELIVERY

## 2022-08-01 NOTE — P.HPOB
History of Present Illness


H&P Date: 22


Chief Complaint: Contractions.





This patient is a pleasant 27-year-old  2 para 1 female estimated 

confinement 2022 estimated gestational age 38-6/7 weeks who presents to 

labor and delivery with complaints of contractions.  Patient states she's began 

about 1:00 this morning.  Cervix on presentation is 7 cm dilated and patient's 

found to be in active labor.  Prenatal care is per Dr. Mckeon it appears to be 

uncomplicated.





Review of Systems


Genitourinary: Reports pregnant


Menstruation: Reports amenorrhea





Past Medical History


Past Medical History: No Reported History


History of Any Multi-Drug Resistant Organisms: None Reported


Past Surgical History: Ear Surgery


Past Anesthesia/Blood Transfusion Reactions: No Reported Reaction, Motion 

Sickness


Past Psychological History: No Psychological Hx Reported


Smoking Status: Never smoker


Past Alcohol Use History: None Reported


Past Drug Use History: None Reported





- Past Family History


  ** Father


Family Medical History: Unable to Obtain


Additional Family Medical History / Comment(s): Patient was adopted, family hx 

unknown.





Medications and Allergies


                                Home Medications











 Medication  Instructions  Recorded  Confirmed  Type


 


Pnv 11/Iron Fum/Folic Acid/Om3 1 each PO DAILY 22 History





[Virt-Tyrese Dha Softgel]    








                                    Allergies











Allergy/AdvReac Type Severity Reaction Status Date / Time


 


No Known Allergies Allergy   Verified 22 15:43














Exam


                                Intake and Output











 22





 06:59 14:59 22:59


 


Other:   


 


  Weight   82.1 kg














- OBG Physical Exam


Abdomen: bowel sounds normal, no diffuse tenderness, no bruit present, no 

guarding noted, no hepatomegaly, no splenomegaly, no mass


Vulva: both: normal


Vagina: normal moisture, no discharge


Cervix: no lesion (Cervix is 9 cm dilated 90% effaced -1 station), no discharge


Uterus: enlarged (Fundal height consistent with dates)





Results





Prenatal blood work shows she is B negative, rubella immune, RPR nonreactive, 

hepatitis B negative, HIV is nonreactive, Glucola was normal, B strep was 

negative.  She did receive RhoGAM on May 17.  Most recent ultrasound at 35 weeks

but the estimated fetal weight at 6 pounds which is the 50th percentile.


Result Diagrams: 


                                 22 15:55





                  Abnormal Lab Results - Last 24 Hours (Table)











  22 Range/Units





  15:55 


 


WBC  21.3 H  (3.8-10.6)  k/uL


 


Neutrophils #  17.8 H  (1.3-7.7)  k/uL


 


Monocytes #  1.1 H  (0-1.0)  k/uL














Assessment and Plan


Assessment: 





This is a pleasant 27-year-old  2 para 1 female 38-6/7 weeks gestation 

admitted to labor and delivery in active labor.  Patient does have a mild 

leukocytosis with a white count of 21.6 however is afebrile.  Although her group

B strep is negative underwent a prophylactically give her a dose of ampicillin 

for this.  Patient's Epidural for pain control and we now anticipate vaginal 

delivery.


(1) 38 weeks gestation of pregnancy


Current Visit: Yes   Status: Acute   Code(s): Z3A.38 - 38 WEEKS GESTATION OF MO

KAM   SNOMED Code(s): 19474466


   





(2) Normal labor


Current Visit: Yes   Status: Acute   Code(s): O80 - ENCOUNTER FOR FULL-TERM 

UNCOMPLICATED DELIVERY; Z37.9 - OUTCOME OF DELIVERY, UNSPECIFIED   SNOMED 

Code(s): 08940520


   





(3) Rh negative status during pregnancy


Current Visit: Yes   Status: Acute   Code(s): O26.899 - OTH PREGNANCY RELATED 

CONDITIONS, UNSPECIFIED TRIMESTER; Z67.91 - UNSPECIFIED BLOOD TYPE, RH NEGATIVE 

 SNOMED Code(s): 784364406

## 2022-08-01 NOTE — P.PROBDLV
Vaginal Delivery Note





- .


Vaginal Delivery Note: 





Normal spontaneous vaginal delivery viable female infant Apgars 8 and 9 delivery

time is 1732 hrs.





Please see dictated H&P for intimate details of this patient's admission.  Brief

summary is a pleasant 27-year-old  2 para 1 female 38-6/7 weeks admitted 

to labor and delivery with complaints of contractions.  On admission patient's 

found to be 7 cm dilated active labor.  She did have an elevated white count 

without any other symptomatology including fever sided give her 1 dose of 

ampicillin.  Patient had epidural placed with good relief.  She has artificial 

rupture membranes at 9 cm dilated for clear fluid.  Labor thereafter progresses 

quickly.  She pushes a proximally 3 times pushes the fetal head to the perineum.

 Posterior perineum is supported we have controlled delivery of infant's head 

over the intact perineum.  Infant is right occiput anterior presentation.  Mouth

and nares are bulb suctioned.  There is no evidence of nuchal cord.  With gentle

downward traction we then have delivery the anterior and posterior shoulder and 

the rest this infant's body.  This is a vigorous viable female infant Apgars are

8 and 9 delivery time is 1732 hrs.  After delivery of the infant the infant is 

placed on the mother's abdomen.  After the umbilical cord is done pulsating is 

doubly clamped and cut.  It appears to be trivascular.  Placenta is then 

spontaneously delivered intact.  Estimated blood loss is approximately 150 mL.  

There is a left labial laceration that doesn't require reapproximation with a 4-

0 Vicryl.  Excellent reapproximation is noted.  All counts are correct 3.  

There are no complications.  Infant and mother stable in delivery room.

## 2022-08-02 VITALS — TEMPERATURE: 98 F | SYSTOLIC BLOOD PRESSURE: 120 MMHG | HEART RATE: 79 BPM | DIASTOLIC BLOOD PRESSURE: 78 MMHG

## 2022-08-02 VITALS — RESPIRATION RATE: 16 BRPM

## 2022-08-02 LAB
BASOPHILS # BLD AUTO: 0.1 K/UL (ref 0–0.2)
BASOPHILS NFR BLD AUTO: 0 %
EOSINOPHIL # BLD AUTO: 0.2 K/UL (ref 0–0.7)
EOSINOPHIL NFR BLD AUTO: 1 %
ERYTHROCYTE [DISTWIDTH] IN BLOOD BY AUTOMATED COUNT: 4.27 M/UL (ref 3.8–5.4)
ERYTHROCYTE [DISTWIDTH] IN BLOOD: 13.2 % (ref 11.5–15.5)
HCT VFR BLD AUTO: 37.4 % (ref 34–46)
HGB BLD-MCNC: 12.8 GM/DL (ref 11.4–16)
LYMPHOCYTES # SPEC AUTO: 2.5 K/UL (ref 1–4.8)
LYMPHOCYTES NFR SPEC AUTO: 15 %
MCH RBC QN AUTO: 30 PG (ref 25–35)
MCHC RBC AUTO-ENTMCNC: 34.3 G/DL (ref 31–37)
MCV RBC AUTO: 87.6 FL (ref 80–100)
MONOCYTES # BLD AUTO: 0.8 K/UL (ref 0–1)
MONOCYTES NFR BLD AUTO: 5 %
NEUTROPHILS # BLD AUTO: 13.3 K/UL (ref 1.3–7.7)
NEUTROPHILS NFR BLD AUTO: 78 %
PLATELET # BLD AUTO: 367 K/UL (ref 150–450)
WBC # BLD AUTO: 17.1 K/UL (ref 3.8–10.6)

## 2022-08-02 RX ADMIN — DOCUSATE SODIUM AND SENNOSIDES SCH: 50; 8.6 TABLET ORAL at 07:59

## 2022-08-02 NOTE — P.PNOBGVD
Subjective





- Subjective


Patient reports: Reports appetite normal, Reports voiding normally, Reports pain

well controlled, Reports ambulating normally


[place]: doing well





Objective





- Latest Vital Signs


Latest vital signs: 


                                   Vital Signs











  Temp Pulse Resp BP Pulse Ox


 


 22 04:00  98.4 F  88  14  121/71  98


 


 22 00:00  98.5 F  86  16  119/77  98


 


 22 19:45  99.2 F  100  16  128/76  99


 


 22 19:15  98.4 F  96  16  117/71  99


 


 22 18:45   91  16  122/67 


 


 22 18:30   92  16  116/60 


 


 22 18:15   101 H  16  117/69 


 


 22 18:04  97.8 F  88  18   97


 


 22 18:00   106 H  16  125/76 


 


 22 17:45  97.9 F  109 H  16  130/73 


 


 22 15:43  97.8 F  88  18   97








                                Intake and Output











 22





 14:59 22:59 06:59


 


Intake Total  155.867 


 


Output Total  1010 


 


Balance  -854.133 


 


Intake:   


 


  Intake, IV Titration  155.867 





  Amount   


 


    Oxytocin 30 Units/500 ml  155.867 





    Ns 30 unit In Saline 1   





    500ml.bag @ Per Protocol   





    IV .Q0M [facility] Rx#:[id number]   


 


Output:   


 


  Urine  750 


 


  Output, Quantitative  260 





  Blood Loss   


 


Other:   


 


  Weight  82.1 kg 














- Exam


Lungs: bilateral: normal


Chest: Normal S1, Normal S2


Extremities: Present: normal


Abdomen: Present: normal appearance, soft


Uterus: Present: normal, firm





- Labs


Labs: 


                  Abnormal Lab Results - Last 24 Hours (Table)











  22 Range/Units





  15:55 


 


WBC  21.3 H  (3.8-10.6)  k/uL


 


Neutrophils #  17.8 H  (1.3-7.7)  k/uL


 


Monocytes #  1.1 H  (0-1.0)  k/uL














Assessment and Plan


Assessment: 





Postpartum day #1.  Patient is resting without complaints and wishes to go home.

 Vital signs are stable she's afebrile.  Uterus is firm nontender and she is 

having normal lochia.  CBC is pending at time of this dictation.  My impression 

this is a normal postpartum course.  Plan is to continue routine postpartum 

care, check CBC, and discharge home later tonight.


(1) 38 weeks gestation of pregnancy


Current Visit: Yes   Status: Acute   Code(s): Z3A.38 - 38 WEEKS GESTATION OF 

PREGNANCY   SNOMED Code(s): 76714723


   





(2) Normal labor


Current Visit: Yes   Status: Acute   Code(s): O80 - ENCOUNTER FOR FULL-TERM 

UNCOMPLICATED DELIVERY; Z37.9 - OUTCOME OF DELIVERY, UNSPECIFIED   SNOMED 

Code(s): 81949551


   





(3) Rh negative status during pregnancy


Current Visit: Yes   Status: Acute   Code(s): O26.899 - OTH PREGNANCY RELATED 

CONDITIONS, UNSPECIFIED TRIMESTER; Z67.91 - UNSPECIFIED BLOOD TYPE, RH NEGATIVE 

 SNOMED Code(s): 729295486 Nostril Rim Text: The closure involved the nostril rim.

## 2022-08-02 NOTE — P.DS
Providers


Date of admission: 


22 15:55





Expected date of discharge: 22


Attending physician: 


John Serra





Primary care physician: 


Stated None








- Discharge Diagnosis(es)


(1) 38 weeks gestation of pregnancy


Current Visit: Yes   Status: Acute   





(2) Normal labor


Current Visit: Yes   Status: Acute   





(3) Rh negative status during pregnancy


Current Visit: Yes   Status: Acute   


Hospital Course: 





Please see dictated H&P for intimate details of this patient's admission.  Brief

summary this is a pleasant 27-year-old  2 para 1 female 38-6/7 weeks 

gestation admitted to labor and delivery in active labor.  Patient quickly goes 

on to have a vaginal delivery viable female infant.  Please see dictated 

delivery note.  Postpartum day 1 patient's feeling well wishes to go home.  

Patient's felt be stable for discharge home follow up with Dr. Mckeon and 6 

weeks.


Procedures: 





Normal spontaneous vaginal delivery


Patient Condition at Discharge: Good





Plan - Discharge Summary


New Discharge Prescriptions: 


New


   Ibuprofen [Motrin] 600 mg PO Q6HR PRN #30 tab


     PRN Reason: Mild Pain (Scale 1 To 3)





No Action


   Pnv 11/Iron Fum/Folic Acid/Om3 [Virt-Tyrese Dha Softgel] 1 each PO DAILY


Discharge Medication List





Pnv 11/Iron Fum/Folic Acid/Om3 [Virt-Tyrese Dha Softgel] 1 each PO DAILY 22 

[History]


Ibuprofen [Motrin] 600 mg PO Q6HR PRN #30 tab 22 [Rx]








Follow up Appointment(s)/Referral(s): 


Bernice Mckeon DO [Doctor of Osteopathic Medicine] - 6 Weeks


Patient Instructions/Handouts:  Vaginal Delivery (DC)


Activity/Diet/Wound Care/Special Instructions: 


No intercourse or anything per vagina for 6 weeks.  Please call if any fever, 

chills, excessive vaginal bleeding, and/or abdominal pain.


Discharge Disposition: HOME SELF-CARE

## 2022-08-10 NOTE — P.MSEPDOC
Presenting Problems





- Arrival Data


Date of Arrival on Unit: 22


Time of Arrival on Unit: 23:21


Mode of Transport: Ambulatory





- Complaint


OB-Reason for Admission/Chief Complaint: Possible Onset of Labor, Vaginal 

Bleeding


Comment: Pt states that she has had bleeding and irregular cramping that started

at 1630.





Prenatal Medical History





- Pregnancy Information


: 2


Para: 1


Term: 1


: 0


Abortions: Spontaneous or Elective: 0


Number of Living Children: 1





- Gestational Age


Gestational Age by TAZ (wks/days): 38 Weeks and 2 Days





Review of Systems





- Review of Systems


Constitutional: No problems


Breast: No problems


ENT: No problems


Cardiovascular: No problems


Respiratory: No problems


Gastrointestinal: No problems


Genitourinary: No problems


Musculoskeletal: No problems


Neurological: No problems


Skin: No problems





Vital Signs





- Temperature


Temperature: 98.2 F


Temperature Source: Oral





- Pulse


  ** Right Pulse Oximetery


Pulse Rate: 90


Pulse Assessment Method: Pulse Oximetry





- Respirations


Respiratory Rate: 16


Oxygen Delivery Method: Room Air


O2 Sat by Pulse Oximetry: 97





- Blood Pressure


  ** Right Arm


Blood Pressure: 137/85


Blood Pressure Mean: 102


Blood Pressure Source: Automatic Cuff





Medical Screen Scoring





- Cervical Exam


Dilation (cm): 2


Effacement (%): 50


Station: -2


Membranes: Intact





- Uterine Contractions


Intensity: Mild


Resting: Soft to palpation





- Fetal Assessment - Baby A


Baseline FHR: 115


Fetal Heart Rate - NICHD Category: Category II (Indeterminate)


NST: Reactive





Physician Notification





- Physician Notified


Physician Notified Date: 22


Physician Notified Time: 00:50


Physician: Bernice Mckeon


New Order Received: Yes





- Notification Comment


Comment: 22 @ 0007: RN reported to Dr. Mckeon on maternal/fetal status, UC 

pattern, NST reactive,.  category 1 FHTs, cervical exam. Pt presents with c/o 

fabby red vaginal bleeding and cramping that started at.  1630 today. Pt states 

it was present in her underwear and when she wiped. Denies.  bleeding on arrival

to triage, no blood in underwear at this time. Pt was seen in the.  office 

today, dilated 1.5/70% per Dr. Mckeon. Pt denies SROM, intercourse, and.  

complications during pregnancy. Pt did state that she had a sub-chorionic bleed 

during.  early pregnancy. RN to re-check pt's cervix an hour post initial 

cervical.  exam. If pt's cervix is unchanged, she may DC home.  22 @ 0050: 

RN called and reported to Dr. Mckeon; cervix unchanged, FHTs have had.  

intermittent subtle dips in basleine for 2-4 mins between 100-120bpm with 

minimal.  variability. RN may monitor for an additional hour, and pt may DC home

if no concerns.  with FHTs.





Maternal Fetal Triage Index





- Maternal Fetal Triage Index


Presenting for scheduled procedure w/no complaint: No





- Stat/Priority 1


Stat Priority 1: No





- Urgent/Priority 2


Urgent Priority 2: No





- Prompt/Priority 3


Prompt Priority 3: No





- Non-Urgent/Priority 4


Non-Urgent Priority 4: Yes


Criteria Met for Priority 4: Early labor signs including vaginal bloody show and

cramping.





Disposition





- Disposition


OB Disposition: Discharge to home


Discharge Date: 22


Discharge Time: 02:00


I agree with the RN Medical Screening Exam: Yes


Case reviewed; plan agreed upon as documented in EMR&OBIX.: Yes


Diagnosis: PRIMARY INADEQUATE CONTRACTIONS

## 2022-10-06 ENCOUNTER — HOSPITAL ENCOUNTER (EMERGENCY)
Dept: HOSPITAL 47 - EC | Age: 27
Discharge: HOME | End: 2022-10-06
Payer: COMMERCIAL

## 2022-10-06 VITALS
TEMPERATURE: 98.4 F | HEART RATE: 78 BPM | DIASTOLIC BLOOD PRESSURE: 83 MMHG | SYSTOLIC BLOOD PRESSURE: 146 MMHG | RESPIRATION RATE: 16 BRPM

## 2022-10-06 DIAGNOSIS — L02.215: Primary | ICD-10-CM

## 2022-10-06 PROCEDURE — 99282 EMERGENCY DEPT VISIT SF MDM: CPT

## 2022-10-06 PROCEDURE — 10060 I&D ABSCESS SIMPLE/SINGLE: CPT

## 2022-10-06 NOTE — ED
Skin/Abscess/FB HPI





- General


Chief complaint: Skin/Abscess/Foreign Body


Stated complaint: lump in genital area


Time Seen by Provider: 10/06/22 21:49


Source: patient


Mode of arrival: ambulatory


Limitations: no limitations





- History of Present Illness


Initial comments: 





This patient is 27-year-old woman who presents with complaint that she has 

experienced a perianal swelling, pain and tenderness.  She states it had come on

over the past few days.  She states that prior to coming in today she started 

having some drainage from the area of the swelling.  She notes that drainage was

thick, white, foul-smelling.  She does bring photograph on the cell phone.  

Patient denies any systemic symptoms, no fever or chills, chest pain, dyspnea, 

tachycardia palpitations.


MD complaint: abscess/boil


-: days(s)


Tetanus Up to Date: yes


Location: buttocks


Severity: moderate


Quality: sharp


Consistency: constant


Improves with: none


Worsens with: none


Context: none


Associated symptoms: denies other symptoms





- Related Data


                                Home Medications











 Medication  Instructions  Recorded  Confirmed


 


Pnv 11/Iron Fum/Folic Acid/Om3 1 each PO DAILY 05/30/22 08/01/22





[Virt-Tyrese Dha Softgel]   








                                  Previous Rx's











 Medication  Instructions  Recorded


 


Ibuprofen [Motrin] 600 mg PO Q6HR PRN #30 tab 08/02/22


 


Sulfamethox-Tmp 800-160Mg [Bactrim 1 each PO Q12HR #14 tab 10/06/22





Ds]  











                                    Allergies











Allergy/AdvReac Type Severity Reaction Status Date / Time


 


No Known Allergies Allergy   Verified 10/06/22 18:32














Review of Systems


ROS Statement: 


Those systems with pertinent positive or pertinent negative responses have been 

documented in the HPI.





ROS Other: All systems not noted in ROS Statement are negative.


Constitutional: Denies: fever, chills


Respiratory: Denies: cough, dyspnea


Cardiovascular: Denies: chest pain, palpitations


Gastrointestinal: Denies: abdominal pain, diarrhea, constipation


Genitourinary: Denies: dysuria, hematuria


Skin: Reports: as per HPI


Hematological/Lymphatic: Denies: easy bleeding





Past Medical History


Past Medical History: No Reported History


History of Any Multi-Drug Resistant Organisms: None Reported


Past Surgical History: Ear Surgery


Past Anesthesia/Blood Transfusion Reactions: No Reported Reaction, Motion 

Sickness


Past Psychological History: No Psychological Hx Reported


Smoking Status: Never smoker


Past Alcohol Use History: None Reported


Past Drug Use History: None Reported





- Past Family History


  ** Father


Family Medical History: Unable to Obtain


Additional Family Medical History / Comment(s): Patient was adopted, family hx 

unknown.





General Exam


Limitations: no limitations


General appearance: alert, in no apparent distress


Respiratory exam: Present: normal lung sounds bilaterally.  Absent: respiratory 

distress, wheezes, rales, rhonchi, stridor


Cardiovascular Exam: Present: regular rate, normal rhythm, normal heart sounds. 

Absent: systolic murmur, diastolic murmur, rubs, gallop


Rectal exam: Present: hemorrhoids, tenderness, other (There is an external 

hemorrhoid which the patient indicates as the area of pain/tenderness/swelling. 

There is a small ulceration to the hemorrhoid and a trace amount of pus 

draining)


Skin exam: Present: warm, dry, other (See above)





Course


                                   Vital Signs











  10/06/22





  18:29


 


Temperature 98.4 F


 


Pulse Rate 78


 


Respiratory 16





Rate 


 


Blood Pressure 146/83


 


O2 Sat by Pulse 98





Oximetry 














Procedures





- Incision & Drainage


Consent Obtained: verbal consent


Site: buttock


Anesthetic Used: lidocaine 1%


I&D Cleaning Method: Alcohol Wipe


Scalpel Used: #11


I&D Drainage Obtained: Pus, Blood


Patient Tolerated Procedure: well, no complications





Medical Decision Making





- Medical Decision Making





Patient is 27-year-old woman complaining of swollen, painful, tender perianal 

lesion.  There is a hemorrhoid at the site of pain.  There is already drainage 

occurring, however after discussion of risks, benefits, indications, patient 

does agree to have the hemorrhoid neck with a scalpel To facilitate drainage.  I

did inject 1% lidocaine and then nicked the hemorrhoid with an 11 scalpel.  

There does not appear to be any evidence of extension up toward the rectum.  

Discussed further follow-up as well as return parameters. 





Disposition


Clinical Impression: 


 Abscess





Disposition: HOME SELF-CARE


Condition: Good


Instructions (If sedation given, give patient instructions):  Abscess Incision 

and Drainage (ED), Abscess (ED)


Prescriptions: 


Sulfamethox-Tmp 800-160Mg [Bactrim Ds] 1 each PO Q12HR #14 tab


Is patient prescribed a controlled substance at d/c from ED?: No


Referrals: 


Duane Gill Jr,  [Primary Care Provider] - 1-2 days


Chad Laughlin MD [Medical Doctor] - 1-2 days

## 2025-06-01 ENCOUNTER — HOSPITAL ENCOUNTER (OUTPATIENT)
Dept: HOSPITAL 47 - FBPOP | Age: 30
Discharge: HOME | End: 2025-06-01
Attending: OBSTETRICS & GYNECOLOGY
Payer: COMMERCIAL

## 2025-06-01 VITALS
TEMPERATURE: 98.6 F | DIASTOLIC BLOOD PRESSURE: 76 MMHG | RESPIRATION RATE: 16 BRPM | HEART RATE: 82 BPM | SYSTOLIC BLOOD PRESSURE: 139 MMHG

## 2025-06-01 DIAGNOSIS — O99.333: ICD-10-CM

## 2025-06-01 DIAGNOSIS — O26.893: Primary | ICD-10-CM

## 2025-06-01 DIAGNOSIS — Z3A.29: ICD-10-CM

## 2025-06-01 DIAGNOSIS — F17.200: ICD-10-CM

## 2025-06-01 LAB
AMORPH SED URNS QL MICRO: (no result) /HPF
APPEARANCE UR: CLEAR
BACTERIA UR QL AUTO: (no result) /HPF
BILIRUB UR QL CFM: (no result)
BILIRUB UR QL STRIP.AUTO: NEGATIVE
BROAD CASTS [PRESENCE] IN URINE BY COMPUTER ASSISTED METHOD: (no result) /LPF
CA CARBONATE CRY #/AREA URNS HPF: (no result) /HPF
CA PHOS CRY UR QL COMP ASSIST: (no result) /HPF
CAOX CRY UR QL COMP ASSIST: (no result) /HPF
CASTS URNS QL MICRO: (no result) /LPF
COLOR UR: COLORLESS
CRYSTALS UR QL: (no result) /HPF
CYSTINE CRY #/AREA URNS HPF: (no result) /HPF
EPITHELIAL CASTS [PRESENCE] IN URINE BY COMPUTER ASSISTED METHOD: (no result) /LPF
ERYTHROCYTE CLUMPS [PRESENCE] IN URINE BY COMPUTER ASSISTED METHOD: (no result) /HPF
FATTY CASTS #/AREA UR COMP ASSIST: (no result) /LPF
GLUCOSE UR QL: NEGATIVE
GRAN CASTS UR QL COMP ASSIST: (no result) /LPF
HYALINE CASTS UR QL AUTO: (no result) /LPF (ref 0–2)
KETONES UR QL STRIP.AUTO: (no result)
LEUCINE CRYSTALS [PRESENCE] IN URINE BY COMPUTER ASSISTED METHOD: (no result) /HPF
LEUKOCYTE ESTERASE UR QL STRIP.AUTO: NEGATIVE
MIXED CELL CASTS UR QL COMP ASSIST: (no result) /LPF
MUCOUS THREADS UR QL AUTO: (no result) /HPF
NITRITE UR QL STRIP.AUTO: NEGATIVE
OVAL FAT BODIES #/AREA UR COMP ASSIST: (no result) /HPF
PH UR: 6.5 [PH] (ref 5–8)
PROT UR QL SSA: (no result)
PROT UR QL: NEGATIVE
RBC CASTS UR QL COMP ASSIST: (no result) /LPF
RBC UR QL: (no result) /HPF (ref 0–5)
RBC UR QL: NEGATIVE
RENAL EPI CELLS UR QL COMP ASSIST: (no result) /HPF
SP GR UR: 1.01 (ref 1–1.03)
SPERM # UR AUTO: (no result) /HPF
SQUAMOUS UR QL AUTO: (no result) /HPF (ref 0–4)
TRANS CELLS UR QL COMP ASSIST: (no result) /HPF (ref 0–1)
TRI-PHOS CRY UR QL COMP ASSIST: (no result) /HPF
TRICHOMONAS UR QL COMP ASSIST: (no result) /HPF
TYROSINE CRYSTALS [PRESENCE] IN URINE BY COMPUTER ASSISTED METHOD: (no result) /HPF
URATE CRY UR QL COMP ASSIST: (no result) /HPF
UROBILINOGEN UR QL STRIP: <2 MG/DL (ref ?–2)
WAXY CASTS #/AREA UR COMP ASSIST: (no result) /LPF
WBC #/AREA URNS HPF: (no result) /HPF (ref 0–5)
WBC CASTS #/AREA URNS LPF: (no result) /LPF
WBC CLUMPS UR QL AUTO: (no result) /HPF
YEAST BUDDING UR QL COMP ASSIST: (no result) /HPF
YEAST HYPHAE UR QL COMP ASSIST: (no result) /HPF

## 2025-06-01 PROCEDURE — 36415 COLL VENOUS BLD VENIPUNCTURE: CPT

## 2025-06-01 PROCEDURE — 99214 OFFICE O/P EST MOD 30 MIN: CPT

## 2025-06-01 PROCEDURE — 96360 HYDRATION IV INFUSION INIT: CPT

## 2025-06-01 PROCEDURE — 81003 URINALYSIS AUTO W/O SCOPE: CPT

## 2025-06-01 PROCEDURE — 59025 FETAL NON-STRESS TEST: CPT

## 2025-06-01 RX ADMIN — POTASSIUM CHLORIDE ONE MLS/HR: 14.9 INJECTION, SOLUTION INTRAVENOUS at 20:03
